# Patient Record
Sex: FEMALE | Race: ASIAN | NOT HISPANIC OR LATINO | Employment: FULL TIME | URBAN - METROPOLITAN AREA
[De-identification: names, ages, dates, MRNs, and addresses within clinical notes are randomized per-mention and may not be internally consistent; named-entity substitution may affect disease eponyms.]

---

## 2023-09-07 ENCOUNTER — HOSPITAL ENCOUNTER (EMERGENCY)
Facility: CLINIC | Age: 23
Discharge: HOME | End: 2023-09-07
Payer: COMMERCIAL

## 2023-09-07 VITALS
OXYGEN SATURATION: 100 % | HEART RATE: 71 BPM | DIASTOLIC BLOOD PRESSURE: 50 MMHG | RESPIRATION RATE: 16 BRPM | WEIGHT: 180 LBS | SYSTOLIC BLOOD PRESSURE: 119 MMHG | TEMPERATURE: 98 F

## 2023-09-07 DIAGNOSIS — R11.2 NAUSEA AND VOMITING, UNSPECIFIED VOMITING TYPE: Primary | ICD-10-CM

## 2023-09-07 DIAGNOSIS — E87.6 HYPOKALEMIA: ICD-10-CM

## 2023-09-07 DIAGNOSIS — F12.90 MARIJUANA USE: ICD-10-CM

## 2023-09-07 LAB
ALBUMIN SERPL BCG-MCNC: 4.4 G/DL (ref 3.5–5.2)
ALBUMIN UR-MCNC: 20 MG/DL
ALP SERPL-CCNC: 68 U/L (ref 35–104)
ALT SERPL W P-5'-P-CCNC: 27 U/L (ref 0–50)
AMORPH CRY #/AREA URNS HPF: ABNORMAL /HPF
ANION GAP SERPL CALCULATED.3IONS-SCNC: 13 MMOL/L (ref 7–15)
APPEARANCE UR: ABNORMAL
AST SERPL W P-5'-P-CCNC: 23 U/L (ref 0–45)
BACTERIA #/AREA URNS HPF: ABNORMAL /HPF
BASOPHILS # BLD AUTO: 0 10E3/UL (ref 0–0.2)
BASOPHILS NFR BLD AUTO: 0 %
BILIRUB SERPL-MCNC: 0.5 MG/DL
BILIRUB UR QL STRIP: NEGATIVE
BUN SERPL-MCNC: 7.9 MG/DL (ref 6–20)
CALCIUM SERPL-MCNC: 9.4 MG/DL (ref 8.6–10)
CHLORIDE SERPL-SCNC: 106 MMOL/L (ref 98–107)
COLOR UR AUTO: ABNORMAL
CREAT SERPL-MCNC: 0.82 MG/DL (ref 0.51–0.95)
DEPRECATED HCO3 PLAS-SCNC: 22 MMOL/L (ref 22–29)
EGFRCR SERPLBLD CKD-EPI 2021: >90 ML/MIN/1.73M2
EOSINOPHIL # BLD AUTO: 0 10E3/UL (ref 0–0.7)
EOSINOPHIL NFR BLD AUTO: 1 %
ERYTHROCYTE [DISTWIDTH] IN BLOOD BY AUTOMATED COUNT: 14.3 % (ref 10–15)
GLUCOSE SERPL-MCNC: 83 MG/DL (ref 70–99)
GLUCOSE UR STRIP-MCNC: NEGATIVE MG/DL
HCG SERPL QL: NEGATIVE
HCT VFR BLD AUTO: 37.6 % (ref 35–47)
HGB BLD-MCNC: 11.9 G/DL (ref 11.7–15.7)
HGB UR QL STRIP: NEGATIVE
IMM GRANULOCYTES # BLD: 0 10E3/UL
IMM GRANULOCYTES NFR BLD: 0 %
KETONES UR STRIP-MCNC: 40 MG/DL
LEUKOCYTE ESTERASE UR QL STRIP: NEGATIVE
LIPASE SERPL-CCNC: 18 U/L (ref 13–60)
LYMPHOCYTES # BLD AUTO: 3 10E3/UL (ref 0.8–5.3)
LYMPHOCYTES NFR BLD AUTO: 42 %
MCH RBC QN AUTO: 24.7 PG (ref 26.5–33)
MCHC RBC AUTO-ENTMCNC: 31.6 G/DL (ref 31.5–36.5)
MCV RBC AUTO: 78 FL (ref 78–100)
MONOCYTES # BLD AUTO: 0.5 10E3/UL (ref 0–1.3)
MONOCYTES NFR BLD AUTO: 8 %
MUCOUS THREADS #/AREA URNS LPF: PRESENT /LPF
NEUTROPHILS # BLD AUTO: 3.5 10E3/UL (ref 1.6–8.3)
NEUTROPHILS NFR BLD AUTO: 49 %
NITRATE UR QL: NEGATIVE
NRBC # BLD AUTO: 0 10E3/UL
NRBC BLD AUTO-RTO: 0 /100
PH UR STRIP: 7.5 [PH] (ref 5–7)
PLATELET # BLD AUTO: 317 10E3/UL (ref 150–450)
POTASSIUM SERPL-SCNC: 3.3 MMOL/L (ref 3.4–5.3)
PROT SERPL-MCNC: 7.1 G/DL (ref 6.4–8.3)
RBC # BLD AUTO: 4.81 10E6/UL (ref 3.8–5.2)
RBC URINE: 3 /HPF
SODIUM SERPL-SCNC: 141 MMOL/L (ref 136–145)
SP GR UR STRIP: 1.02 (ref 1–1.03)
SQUAMOUS EPITHELIAL: 1 /HPF
UROBILINOGEN UR STRIP-MCNC: 2 MG/DL
WBC # BLD AUTO: 7.1 10E3/UL (ref 4–11)
WBC URINE: 0 /HPF

## 2023-09-07 PROCEDURE — 96360 HYDRATION IV INFUSION INIT: CPT

## 2023-09-07 PROCEDURE — 81001 URINALYSIS AUTO W/SCOPE: CPT

## 2023-09-07 PROCEDURE — 80053 COMPREHEN METABOLIC PANEL: CPT

## 2023-09-07 PROCEDURE — 85025 COMPLETE CBC W/AUTO DIFF WBC: CPT

## 2023-09-07 PROCEDURE — 250N000013 HC RX MED GY IP 250 OP 250 PS 637

## 2023-09-07 PROCEDURE — 84703 CHORIONIC GONADOTROPIN ASSAY: CPT

## 2023-09-07 PROCEDURE — 250N000011 HC RX IP 250 OP 636

## 2023-09-07 PROCEDURE — 258N000003 HC RX IP 258 OP 636

## 2023-09-07 PROCEDURE — 99283 EMERGENCY DEPT VISIT LOW MDM: CPT | Mod: 25

## 2023-09-07 PROCEDURE — 36415 COLL VENOUS BLD VENIPUNCTURE: CPT | Performed by: EMERGENCY MEDICINE

## 2023-09-07 PROCEDURE — 83690 ASSAY OF LIPASE: CPT

## 2023-09-07 RX ORDER — ONDANSETRON 4 MG/1
4 TABLET, ORALLY DISINTEGRATING ORAL ONCE
Status: COMPLETED | OUTPATIENT
Start: 2023-09-07 | End: 2023-09-07

## 2023-09-07 RX ORDER — POTASSIUM CHLORIDE 1.5 G/1.58G
40 POWDER, FOR SOLUTION ORAL ONCE
Status: COMPLETED | OUTPATIENT
Start: 2023-09-07 | End: 2023-09-07

## 2023-09-07 RX ORDER — ONDANSETRON 4 MG/1
4 TABLET, ORALLY DISINTEGRATING ORAL EVERY 8 HOURS PRN
Qty: 12 TABLET | Refills: 0 | Status: SHIPPED | OUTPATIENT
Start: 2023-09-07 | End: 2023-09-19

## 2023-09-07 RX ADMIN — SODIUM CHLORIDE 1000 ML: 9 INJECTION, SOLUTION INTRAVENOUS at 17:29

## 2023-09-07 RX ADMIN — ONDANSETRON 4 MG: 4 TABLET, ORALLY DISINTEGRATING ORAL at 17:30

## 2023-09-07 RX ADMIN — POTASSIUM CHLORIDE 40 MEQ: 1.5 POWDER, FOR SOLUTION ORAL at 17:34

## 2023-09-07 ASSESSMENT — ACTIVITIES OF DAILY LIVING (ADL): ADLS_ACUITY_SCORE: 35

## 2023-09-07 NOTE — ED TRIAGE NOTES
Patient presents with 2 months of nausea and vomiting. Went to [facility] yesterday and had urine and pregnancy test. States she is not pregnant.

## 2023-09-07 NOTE — ED NOTES
Patient in with complaints of intermittent N/V for 2 months. Patient was seen [facility] for [facility] same thing. Patient states not getting any better. Patient anxious and hyperventilating.

## 2023-09-07 NOTE — ED PROVIDER NOTES
History     Chief Complaint:  Vomiting       HPI   Suzie Islas is a 23 year old female who presents with nausea and vomiting. The nausea has been ongoing for a couple months. She has been vomiting since a couple weeks ago, but it stopped for a period of time and picked back up this week. The patient reports having chills and hot flashes a couple weeks ago and then again this week. She reports waking up and vomiting and then being okay for the rest of the day. She reports shortness of breath with her nausea, regular marijuana use, minimal alcohol use, and that her last period was a couple weeks ago. She is on denies vomiting today, blood in her vomit, diarrhea, blood in her stool, dysuria, urinary frequency, chest pain, or abdominal pain.    Of note, she has struggled with nausea her whole life and she believes this is more.    Independent Historian:    The patient provided the history noted above.    Review of External Notes:  No recent ED or UC visits on chart review    Medications:    Lexapro  Vistaril  Ativan    Past Medical History:    Anxiety/depression    Past Surgical History:    Third molar extraction     Physical Exam   Patient Vitals for the past 24 hrs:   BP Temp Temp src Pulse Resp SpO2 Weight   09/07/23 1140 119/50 98  F (36.7  C) Temporal 71 16 100 % 81.6 kg (180 lb)      Physical Exam  General: Nontoxic-appearing adult female sitting on gurney holding stuffed animal  HENT:   Head: Atraumatic.  Mouth/Throat: Oropharynx clear and moist.  Eyes: Conjunctive and EOM normal. PERRLA.  Neck: Normal ROM. No rigidity.  CV: Regular rate and rhythm. Normal S1, S2. No appreciable murmurs.  Resp: Lungs clear to auscultation bilaterally. Normal respiratory effort. No stridor or cough observed.  GI: Bowel sounds normal. Abdomen soft, non distended and nontender. No rebound or guarding.  MSK: Normal range of motion.  Skin: Warm and dry.  No rashes.  Neuro: Awake, alert, oriented x 3.  Psych: Normal mood and  affect.     Emergency Department Course     Laboratory:  Labs Ordered and Resulted from Time of ED Arrival to Time of ED Departure   COMPREHENSIVE METABOLIC PANEL - Abnormal       Result Value    Sodium 141      Potassium 3.3 (*)     Chloride 106      Carbon Dioxide (CO2) 22      Anion Gap 13      Urea Nitrogen 7.9      Creatinine 0.82      Calcium 9.4      Glucose 83      Alkaline Phosphatase 68      AST 23      ALT 27      Protein Total 7.1      Albumin 4.4      Bilirubin Total 0.5      GFR Estimate >90     ROUTINE UA WITH MICROSCOPIC - Abnormal    Color Urine Orange (*)     Appearance Urine Cloudy (*)     Glucose Urine Negative      Bilirubin Urine Negative      Ketones Urine 40 (*)     Specific Gravity Urine 1.024      Blood Urine Negative      pH Urine 7.5 (*)     Protein Albumin Urine 20 (*)     Urobilinogen Urine 2.0      Nitrite Urine Negative      Leukocyte Esterase Urine Negative      Bacteria Urine Few (*)     Mucus Urine Present (*)     Amorphous Crystals Urine Few (*)     RBC Urine 3 (*)     WBC Urine 0      Squamous Epithelials Urine 1     CBC WITH PLATELETS AND DIFFERENTIAL - Abnormal    WBC Count 7.1      RBC Count 4.81      Hemoglobin 11.9      Hematocrit 37.6      MCV 78      MCH 24.7 (*)     MCHC 31.6      RDW 14.3      Platelet Count 317      % Neutrophils 49      % Lymphocytes 42      % Monocytes 8      % Eosinophils 1      % Basophils 0      % Immature Granulocytes 0      NRBCs per 100 WBC 0      Absolute Neutrophils 3.5      Absolute Lymphocytes 3.0      Absolute Monocytes 0.5      Absolute Eosinophils 0.0      Absolute Basophils 0.0      Absolute Immature Granulocytes 0.0      Absolute NRBCs 0.0     LIPASE - Normal    Lipase 18     HCG QUALITATIVE PREGNANCY - Normal    hCG Serum Qualitative Negative        Emergency Department Course & Assessments:       Interventions:  Medications   0.9% sodium chloride BOLUS (0 mLs Intravenous Stopped 9/7/23 7962)   ondansetron (ZOFRAN ODT) ODT tab 4 mg (4  mg Oral $Given 9/7/23 1730)   potassium chloride (KLOR-CON) Packet 40 mEq (40 mEq Oral $Given 9/7/23 1735)        Assessments:  1646 I obtained history and examined the patient as noted above.   1827 I rechecked and updated the patient. I deemed the patient safe to discharge home. Tolerated oral.    Independent Interpretation (X-rays, CTs, rhythm strip):  None    Consultations/Discussion of Management or Tests:  None       Social Determinants of Health affecting care:  None     Disposition:  The patient was discharged to home.     Impression & Plan    CMS Diagnoses: None    Medical Decision Making:  Is a pleasant 23-year-old femaleRuby with a history of anxiety and depression who presents to the emergency department for nausea and vomiting.  Patient states that this is a chronic issue for her.  On arrival here she is afebrile and nontoxic-appearing.  On exam, she has no abdominal tenderness.  Pregnancy test negative.  Urine without evidence for infection.  No leukocytosis or anemia on CBC.  Mildly low potassium on metabolic panel, which was replaced orally here after fluids and Zofran.  No JAUN.  No glucose derangement. Lipase WNL, reassuring against pancreatitis.  Patient does not regularly use marijuana and I wonder if cyclical vomiting could be at play from her cannabis use.  At this juncture, I feel she is safe for discharge home with a nontender  abdomen, normal vitals and reassuring labs.  Provided her a prescription for Zofran for nausea at home.  Patient will call her PCP for close follow-up in clinic for recheck.  Return precautions discussed and provided in writing.  She will come back if she develops a fever, abdominal pain, blood in her vomit, blood in her stools, urinary symptoms or any other acute new problem.  Patient felt reassured and was discharged home in improved condition    Diagnosis:    ICD-10-CM    1. Nausea and vomiting, unspecified vomiting type  R11.2       2. Marijuana use  F12.90       3.  Hypokalemia  E87.6          Discharge Medications:  New Prescriptions    ONDANSETRON (ZOFRAN ODT) 4 MG ODT TAB    Take 1 tablet (4 mg) by mouth every 8 hours as needed for nausea      Scribe Disclosure:  I, Jhonny Chin, am serving as a scribe at 4:17 PM on 9/7/2023 to document services personally performed by Lydia Patrick PA-C based on my observations and the provider's statements to me.               Lydia Patrick PA-C  09/07/23 8945

## 2024-04-15 ENCOUNTER — HOSPITAL ENCOUNTER (INPATIENT)
Facility: CLINIC | Age: 24
LOS: 1 days | Discharge: HOME | End: 2024-04-17
Attending: EMERGENCY MEDICINE | Admitting: OBSTETRICS & GYNECOLOGY
Payer: COMMERCIAL

## 2024-04-15 ENCOUNTER — APPOINTMENT (OUTPATIENT)
Dept: CT IMAGING | Facility: CLINIC | Age: 24
End: 2024-04-15
Attending: EMERGENCY MEDICINE
Payer: COMMERCIAL

## 2024-04-15 ENCOUNTER — APPOINTMENT (OUTPATIENT)
Dept: ULTRASOUND IMAGING | Facility: CLINIC | Age: 24
End: 2024-04-15
Attending: EMERGENCY MEDICINE
Payer: COMMERCIAL

## 2024-04-15 ENCOUNTER — ANESTHESIA (OUTPATIENT)
Dept: SURGERY | Facility: CLINIC | Age: 24
End: 2024-04-15
Payer: COMMERCIAL

## 2024-04-15 ENCOUNTER — ANESTHESIA EVENT (OUTPATIENT)
Dept: SURGERY | Facility: CLINIC | Age: 24
End: 2024-04-15
Payer: COMMERCIAL

## 2024-04-15 DIAGNOSIS — N83.202 HEMORRHAGIC CYST OF LEFT OVARY: ICD-10-CM

## 2024-04-15 DIAGNOSIS — Z98.890 S/P LAPAROSCOPY: Primary | ICD-10-CM

## 2024-04-15 PROBLEM — K66.1 HEMOPERITONEUM: Status: ACTIVE | Noted: 2024-04-15

## 2024-04-15 PROBLEM — N83.209 HEMORRHAGIC OVARIAN CYST: Status: ACTIVE | Noted: 2024-04-15

## 2024-04-15 LAB
ABO/RH(D): NORMAL
ALBUMIN SERPL BCG-MCNC: 3.6 G/DL (ref 3.5–5.2)
ALP SERPL-CCNC: 47 U/L (ref 40–150)
ALT SERPL W P-5'-P-CCNC: 14 U/L (ref 0–50)
ANION GAP SERPL CALCULATED.3IONS-SCNC: 11 MMOL/L (ref 7–15)
ANION GAP SERPL CALCULATED.3IONS-SCNC: 12 MMOL/L (ref 7–15)
ANTIBODY SCREEN: NEGATIVE
AST SERPL W P-5'-P-CCNC: 18 U/L (ref 0–45)
BASOPHILS # BLD AUTO: 0 10E3/UL (ref 0–0.2)
BASOPHILS NFR BLD AUTO: 0 %
BILIRUB SERPL-MCNC: 0.3 MG/DL
BLD PROD TYP BPU: NORMAL
BLOOD COMPONENT TYPE: NORMAL
BUN SERPL-MCNC: 6.6 MG/DL (ref 6–20)
BUN SERPL-MCNC: 6.6 MG/DL (ref 6–20)
CALCIUM SERPL-MCNC: 8.9 MG/DL (ref 8.6–10)
CALCIUM SERPL-MCNC: 8.9 MG/DL (ref 8.6–10)
CHLORIDE SERPL-SCNC: 106 MMOL/L (ref 98–107)
CHLORIDE SERPL-SCNC: 107 MMOL/L (ref 98–107)
CODING SYSTEM: NORMAL
CREAT SERPL-MCNC: 0.91 MG/DL (ref 0.51–0.95)
CREAT SERPL-MCNC: 0.92 MG/DL (ref 0.51–0.95)
CROSSMATCH: NORMAL
DEPRECATED HCO3 PLAS-SCNC: 22 MMOL/L (ref 22–29)
DEPRECATED HCO3 PLAS-SCNC: 23 MMOL/L (ref 22–29)
EGFRCR SERPLBLD CKD-EPI 2021: 89 ML/MIN/1.73M2
EGFRCR SERPLBLD CKD-EPI 2021: 90 ML/MIN/1.73M2
EOSINOPHIL # BLD AUTO: 0.1 10E3/UL (ref 0–0.7)
EOSINOPHIL NFR BLD AUTO: 1 %
ERYTHROCYTE [DISTWIDTH] IN BLOOD BY AUTOMATED COUNT: 14.2 % (ref 10–15)
ERYTHROCYTE [DISTWIDTH] IN BLOOD BY AUTOMATED COUNT: 14.3 % (ref 10–15)
GLUCOSE SERPL-MCNC: 144 MG/DL (ref 70–99)
GLUCOSE SERPL-MCNC: 144 MG/DL (ref 70–99)
HCG SER QL IA.RAPID: NEGATIVE
HCT VFR BLD AUTO: 23.1 % (ref 35–47)
HCT VFR BLD AUTO: 29.2 % (ref 35–47)
HGB BLD-MCNC: 7.5 G/DL (ref 11.7–15.7)
HGB BLD-MCNC: 8.1 G/DL (ref 11.7–15.7)
HGB BLD-MCNC: 8.1 G/DL (ref 11.7–15.7)
HGB BLD-MCNC: 8.7 G/DL (ref 11.7–15.7)
HGB BLD-MCNC: 9.6 G/DL (ref 11.7–15.7)
HOLD SPECIMEN: NORMAL
HOLD SPECIMEN: NORMAL
IMM GRANULOCYTES # BLD: 0.1 10E3/UL
IMM GRANULOCYTES NFR BLD: 0 %
ISSUE DATE AND TIME: NORMAL
LYMPHOCYTES # BLD AUTO: 3.2 10E3/UL (ref 0.8–5.3)
LYMPHOCYTES NFR BLD AUTO: 21 %
MCH RBC QN AUTO: 25.4 PG (ref 26.5–33)
MCH RBC QN AUTO: 25.5 PG (ref 26.5–33)
MCHC RBC AUTO-ENTMCNC: 32.5 G/DL (ref 31.5–36.5)
MCHC RBC AUTO-ENTMCNC: 32.9 G/DL (ref 31.5–36.5)
MCV RBC AUTO: 77 FL (ref 78–100)
MCV RBC AUTO: 79 FL (ref 78–100)
MONOCYTES # BLD AUTO: 0.9 10E3/UL (ref 0–1.3)
MONOCYTES NFR BLD AUTO: 6 %
NEUTROPHILS # BLD AUTO: 10.9 10E3/UL (ref 1.6–8.3)
NEUTROPHILS NFR BLD AUTO: 72 %
NRBC # BLD AUTO: 0 10E3/UL
NRBC BLD AUTO-RTO: 0 /100
PLATELET # BLD AUTO: 229 10E3/UL (ref 150–450)
PLATELET # BLD AUTO: 328 10E3/UL (ref 150–450)
POTASSIUM SERPL-SCNC: 3.1 MMOL/L (ref 3.4–5.3)
POTASSIUM SERPL-SCNC: 3.1 MMOL/L (ref 3.4–5.3)
POTASSIUM SERPL-SCNC: 4 MMOL/L (ref 3.4–5.3)
PROT SERPL-MCNC: 6.1 G/DL (ref 6.4–8.3)
RBC # BLD AUTO: 2.94 10E6/UL (ref 3.8–5.2)
RBC # BLD AUTO: 3.78 10E6/UL (ref 3.8–5.2)
SODIUM SERPL-SCNC: 140 MMOL/L (ref 135–145)
SODIUM SERPL-SCNC: 141 MMOL/L (ref 135–145)
SPECIMEN EXPIRATION DATE: NORMAL
UNIT ABO/RH: NORMAL
UNIT NUMBER: NORMAL
UNIT STATUS: NORMAL
UNIT TYPE ISBT: 5100
WBC # BLD AUTO: 10.4 10E3/UL (ref 4–11)
WBC # BLD AUTO: 15.2 10E3/UL (ref 4–11)

## 2024-04-15 PROCEDURE — 36415 COLL VENOUS BLD VENIPUNCTURE: CPT | Performed by: OBSTETRICS & GYNECOLOGY

## 2024-04-15 PROCEDURE — 84155 ASSAY OF PROTEIN SERUM: CPT | Performed by: EMERGENCY MEDICINE

## 2024-04-15 PROCEDURE — 250N000011 HC RX IP 250 OP 636: Performed by: ANESTHESIOLOGY

## 2024-04-15 PROCEDURE — 370N000017 HC ANESTHESIA TECHNICAL FEE, PER MIN: Performed by: OBSTETRICS & GYNECOLOGY

## 2024-04-15 PROCEDURE — 86923 COMPATIBILITY TEST ELECTRIC: CPT | Performed by: STUDENT IN AN ORGANIZED HEALTH CARE EDUCATION/TRAINING PROGRAM

## 2024-04-15 PROCEDURE — 36415 COLL VENOUS BLD VENIPUNCTURE: CPT | Performed by: EMERGENCY MEDICINE

## 2024-04-15 PROCEDURE — 85018 HEMOGLOBIN: CPT | Performed by: OBSTETRICS & GYNECOLOGY

## 2024-04-15 PROCEDURE — 86900 BLOOD TYPING SEROLOGIC ABO: CPT | Performed by: EMERGENCY MEDICINE

## 2024-04-15 PROCEDURE — 0W3R4ZZ CONTROL BLEEDING IN GENITOURINARY TRACT, PERCUTANEOUS ENDOSCOPIC APPROACH: ICD-10-PCS | Performed by: OBSTETRICS & GYNECOLOGY

## 2024-04-15 PROCEDURE — 250N000013 HC RX MED GY IP 250 OP 250 PS 637: Performed by: OBSTETRICS & GYNECOLOGY

## 2024-04-15 PROCEDURE — 58661 LAPAROSCOPY REMOVE ADNEXA: CPT | Performed by: ANESTHESIOLOGY

## 2024-04-15 PROCEDURE — 250N000011 HC RX IP 250 OP 636: Mod: JZ | Performed by: ANESTHESIOLOGY

## 2024-04-15 PROCEDURE — 250N000009 HC RX 250: Performed by: EMERGENCY MEDICINE

## 2024-04-15 PROCEDURE — 99140 ANES COMP EMERGENCY COND: CPT | Performed by: NURSE ANESTHETIST, CERTIFIED REGISTERED

## 2024-04-15 PROCEDURE — 250N000011 HC RX IP 250 OP 636

## 2024-04-15 PROCEDURE — 272N000001 HC OR GENERAL SUPPLY STERILE: Performed by: OBSTETRICS & GYNECOLOGY

## 2024-04-15 PROCEDURE — 76856 US EXAM PELVIC COMPLETE: CPT

## 2024-04-15 PROCEDURE — 84132 ASSAY OF SERUM POTASSIUM: CPT | Performed by: OBSTETRICS & GYNECOLOGY

## 2024-04-15 PROCEDURE — 250N000009 HC RX 250: Performed by: ANESTHESIOLOGY

## 2024-04-15 PROCEDURE — 250N000009 HC RX 250: Performed by: OBSTETRICS & GYNECOLOGY

## 2024-04-15 PROCEDURE — 96374 THER/PROPH/DIAG INJ IV PUSH: CPT

## 2024-04-15 PROCEDURE — 250N000011 HC RX IP 250 OP 636: Performed by: OBSTETRICS & GYNECOLOGY

## 2024-04-15 PROCEDURE — 710N000009 HC RECOVERY PHASE 1, LEVEL 1, PER MIN: Performed by: OBSTETRICS & GYNECOLOGY

## 2024-04-15 PROCEDURE — P9016 RBC LEUKOCYTES REDUCED: HCPCS | Performed by: OBSTETRICS & GYNECOLOGY

## 2024-04-15 PROCEDURE — 84703 CHORIONIC GONADOTROPIN ASSAY: CPT

## 2024-04-15 PROCEDURE — 96361 HYDRATE IV INFUSION ADD-ON: CPT

## 2024-04-15 PROCEDURE — 258N000003 HC RX IP 258 OP 636: Performed by: ANESTHESIOLOGY

## 2024-04-15 PROCEDURE — 86923 COMPATIBILITY TEST ELECTRIC: CPT | Performed by: OBSTETRICS & GYNECOLOGY

## 2024-04-15 PROCEDURE — 258N000003 HC RX IP 258 OP 636: Performed by: EMERGENCY MEDICINE

## 2024-04-15 PROCEDURE — 74177 CT ABD & PELVIS W/CONTRAST: CPT

## 2024-04-15 PROCEDURE — 80048 BASIC METABOLIC PNL TOTAL CA: CPT | Performed by: EMERGENCY MEDICINE

## 2024-04-15 PROCEDURE — P9045 ALBUMIN (HUMAN), 5%, 250 ML: HCPCS | Mod: JZ | Performed by: ANESTHESIOLOGY

## 2024-04-15 PROCEDURE — 258N000001 HC RX 258: Performed by: OBSTETRICS & GYNECOLOGY

## 2024-04-15 PROCEDURE — 99291 CRITICAL CARE FIRST HOUR: CPT | Mod: 25

## 2024-04-15 PROCEDURE — 250N000011 HC RX IP 250 OP 636: Performed by: EMERGENCY MEDICINE

## 2024-04-15 PROCEDURE — 360N000076 HC SURGERY LEVEL 3, PER MIN: Performed by: OBSTETRICS & GYNECOLOGY

## 2024-04-15 PROCEDURE — 0W9G4ZZ DRAINAGE OF PERITONEAL CAVITY, PERCUTANEOUS ENDOSCOPIC APPROACH: ICD-10-PCS | Performed by: OBSTETRICS & GYNECOLOGY

## 2024-04-15 PROCEDURE — 999N000141 HC STATISTIC PRE-PROCEDURE NURSING ASSESSMENT: Performed by: OBSTETRICS & GYNECOLOGY

## 2024-04-15 PROCEDURE — 85025 COMPLETE CBC W/AUTO DIFF WBC: CPT | Performed by: EMERGENCY MEDICINE

## 2024-04-15 PROCEDURE — 96375 TX/PRO/DX INJ NEW DRUG ADDON: CPT

## 2024-04-15 PROCEDURE — 58661 LAPAROSCOPY REMOVE ADNEXA: CPT | Performed by: NURSE ANESTHETIST, CERTIFIED REGISTERED

## 2024-04-15 PROCEDURE — 96376 TX/PRO/DX INJ SAME DRUG ADON: CPT

## 2024-04-15 PROCEDURE — 85027 COMPLETE CBC AUTOMATED: CPT | Performed by: EMERGENCY MEDICINE

## 2024-04-15 RX ORDER — ONDANSETRON 2 MG/ML
INJECTION INTRAMUSCULAR; INTRAVENOUS PRN
Status: DISCONTINUED | OUTPATIENT
Start: 2024-04-15 | End: 2024-04-15

## 2024-04-15 RX ORDER — FENTANYL CITRATE 50 UG/ML
INJECTION, SOLUTION INTRAMUSCULAR; INTRAVENOUS PRN
Status: DISCONTINUED | OUTPATIENT
Start: 2024-04-15 | End: 2024-04-15

## 2024-04-15 RX ORDER — FENTANYL CITRATE 0.05 MG/ML
50 INJECTION, SOLUTION INTRAMUSCULAR; INTRAVENOUS EVERY 5 MIN PRN
Status: DISCONTINUED | OUTPATIENT
Start: 2024-04-15 | End: 2024-04-15 | Stop reason: HOSPADM

## 2024-04-15 RX ORDER — IOPAMIDOL 755 MG/ML
91 INJECTION, SOLUTION INTRAVASCULAR ONCE
Status: COMPLETED | OUTPATIENT
Start: 2024-04-15 | End: 2024-04-15

## 2024-04-15 RX ORDER — ACETAMINOPHEN 325 MG/1
975 TABLET ORAL EVERY 6 HOURS
Qty: 36 TABLET | Refills: 0 | Status: DISCONTINUED | OUTPATIENT
Start: 2024-04-15 | End: 2024-04-17 | Stop reason: HOSPADM

## 2024-04-15 RX ORDER — HYDROMORPHONE HCL IN WATER/PF 6 MG/30 ML
0.2 PATIENT CONTROLLED ANALGESIA SYRINGE INTRAVENOUS EVERY 5 MIN PRN
Status: DISCONTINUED | OUTPATIENT
Start: 2024-04-15 | End: 2024-04-15 | Stop reason: HOSPADM

## 2024-04-15 RX ORDER — LIDOCAINE HYDROCHLORIDE 20 MG/ML
INJECTION, SOLUTION INFILTRATION; PERINEURAL PRN
Status: DISCONTINUED | OUTPATIENT
Start: 2024-04-15 | End: 2024-04-15

## 2024-04-15 RX ORDER — NALOXONE HYDROCHLORIDE 0.4 MG/ML
0.4 INJECTION, SOLUTION INTRAMUSCULAR; INTRAVENOUS; SUBCUTANEOUS
Status: DISCONTINUED | OUTPATIENT
Start: 2024-04-15 | End: 2024-04-17 | Stop reason: HOSPADM

## 2024-04-15 RX ORDER — HYDROMORPHONE HYDROCHLORIDE 1 MG/ML
0.5 INJECTION, SOLUTION INTRAMUSCULAR; INTRAVENOUS; SUBCUTANEOUS ONCE
Status: COMPLETED | OUTPATIENT
Start: 2024-04-15 | End: 2024-04-15

## 2024-04-15 RX ORDER — PROPOFOL 10 MG/ML
INJECTION, EMULSION INTRAVENOUS CONTINUOUS PRN
Status: DISCONTINUED | OUTPATIENT
Start: 2024-04-15 | End: 2024-04-15

## 2024-04-15 RX ORDER — DEXMEDETOMIDINE HYDROCHLORIDE 4 UG/ML
INJECTION, SOLUTION INTRAVENOUS PRN
Status: DISCONTINUED | OUTPATIENT
Start: 2024-04-15 | End: 2024-04-15

## 2024-04-15 RX ORDER — HYDROMORPHONE HCL IN WATER/PF 6 MG/30 ML
0.2 PATIENT CONTROLLED ANALGESIA SYRINGE INTRAVENOUS
Status: DISCONTINUED | OUTPATIENT
Start: 2024-04-15 | End: 2024-04-17 | Stop reason: HOSPADM

## 2024-04-15 RX ORDER — NALOXONE HYDROCHLORIDE 0.4 MG/ML
0.2 INJECTION, SOLUTION INTRAMUSCULAR; INTRAVENOUS; SUBCUTANEOUS
Status: DISCONTINUED | OUTPATIENT
Start: 2024-04-15 | End: 2024-04-17 | Stop reason: HOSPADM

## 2024-04-15 RX ORDER — CRANBERRY FRUIT EXTRACT 250 MG
1 CAPSULE ORAL AT BEDTIME
COMMUNITY

## 2024-04-15 RX ORDER — NALOXONE HYDROCHLORIDE 0.4 MG/ML
0.1 INJECTION, SOLUTION INTRAMUSCULAR; INTRAVENOUS; SUBCUTANEOUS
Status: DISCONTINUED | OUTPATIENT
Start: 2024-04-15 | End: 2024-04-15 | Stop reason: HOSPADM

## 2024-04-15 RX ORDER — MAGNESIUM HYDROXIDE 1200 MG/15ML
LIQUID ORAL PRN
Status: DISCONTINUED | OUTPATIENT
Start: 2024-04-15 | End: 2024-04-15 | Stop reason: HOSPADM

## 2024-04-15 RX ORDER — GLYCOPYRROLATE 0.2 MG/ML
INJECTION, SOLUTION INTRAMUSCULAR; INTRAVENOUS PRN
Status: DISCONTINUED | OUTPATIENT
Start: 2024-04-15 | End: 2024-04-15

## 2024-04-15 RX ORDER — BUPIVACAINE HYDROCHLORIDE AND EPINEPHRINE 2.5; 5 MG/ML; UG/ML
INJECTION, SOLUTION EPIDURAL; INFILTRATION; INTRACAUDAL; PERINEURAL PRN
Status: DISCONTINUED | OUTPATIENT
Start: 2024-04-15 | End: 2024-04-15 | Stop reason: HOSPADM

## 2024-04-15 RX ORDER — SCOLOPAMINE TRANSDERMAL SYSTEM 1 MG/1
1 PATCH, EXTENDED RELEASE TRANSDERMAL
Status: DISCONTINUED | OUTPATIENT
Start: 2024-04-15 | End: 2024-04-17 | Stop reason: HOSPADM

## 2024-04-15 RX ORDER — ONDANSETRON 2 MG/ML
4 INJECTION INTRAMUSCULAR; INTRAVENOUS ONCE
Status: COMPLETED | OUTPATIENT
Start: 2024-04-15 | End: 2024-04-15

## 2024-04-15 RX ORDER — SODIUM CHLORIDE, SODIUM LACTATE, POTASSIUM CHLORIDE, CALCIUM CHLORIDE 600; 310; 30; 20 MG/100ML; MG/100ML; MG/100ML; MG/100ML
INJECTION, SOLUTION INTRAVENOUS CONTINUOUS
Status: DISCONTINUED | OUTPATIENT
Start: 2024-04-15 | End: 2024-04-15 | Stop reason: HOSPADM

## 2024-04-15 RX ORDER — HYDROMORPHONE HCL IN WATER/PF 6 MG/30 ML
0.4 PATIENT CONTROLLED ANALGESIA SYRINGE INTRAVENOUS EVERY 5 MIN PRN
Status: DISCONTINUED | OUTPATIENT
Start: 2024-04-15 | End: 2024-04-15 | Stop reason: HOSPADM

## 2024-04-15 RX ORDER — SODIUM CHLORIDE, SODIUM LACTATE, POTASSIUM CHLORIDE, CALCIUM CHLORIDE 600; 310; 30; 20 MG/100ML; MG/100ML; MG/100ML; MG/100ML
INJECTION, SOLUTION INTRAVENOUS CONTINUOUS PRN
Status: DISCONTINUED | OUTPATIENT
Start: 2024-04-15 | End: 2024-04-15

## 2024-04-15 RX ORDER — PROPOFOL 10 MG/ML
INJECTION, EMULSION INTRAVENOUS PRN
Status: DISCONTINUED | OUTPATIENT
Start: 2024-04-15 | End: 2024-04-15

## 2024-04-15 RX ORDER — IBUPROFEN 400 MG/1
800 TABLET, FILM COATED ORAL EVERY 6 HOURS
Status: DISCONTINUED | OUTPATIENT
Start: 2024-04-15 | End: 2024-04-17 | Stop reason: HOSPADM

## 2024-04-15 RX ORDER — ONDANSETRON 4 MG/1
4 TABLET, ORALLY DISINTEGRATING ORAL EVERY 6 HOURS PRN
Status: DISCONTINUED | OUTPATIENT
Start: 2024-04-15 | End: 2024-04-17 | Stop reason: HOSPADM

## 2024-04-15 RX ORDER — PROCHLORPERAZINE MALEATE 10 MG
10 TABLET ORAL EVERY 6 HOURS PRN
Status: DISCONTINUED | OUTPATIENT
Start: 2024-04-15 | End: 2024-04-17 | Stop reason: HOSPADM

## 2024-04-15 RX ORDER — KETOROLAC TROMETHAMINE 15 MG/ML
15 INJECTION, SOLUTION INTRAMUSCULAR; INTRAVENOUS EVERY 6 HOURS
Status: DISCONTINUED | OUTPATIENT
Start: 2024-04-15 | End: 2024-04-17 | Stop reason: HOSPADM

## 2024-04-15 RX ORDER — LACTOBACILLUS RHAMNOSUS GG 10B CELL
1 CAPSULE ORAL AT BEDTIME
COMMUNITY

## 2024-04-15 RX ORDER — LIDOCAINE 40 MG/G
CREAM TOPICAL
Status: DISCONTINUED | OUTPATIENT
Start: 2024-04-15 | End: 2024-04-17 | Stop reason: HOSPADM

## 2024-04-15 RX ORDER — ONDANSETRON 4 MG/1
4 TABLET, ORALLY DISINTEGRATING ORAL EVERY 30 MIN PRN
Status: DISCONTINUED | OUTPATIENT
Start: 2024-04-15 | End: 2024-04-15 | Stop reason: HOSPADM

## 2024-04-15 RX ORDER — DEXAMETHASONE SODIUM PHOSPHATE 4 MG/ML
INJECTION, SOLUTION INTRA-ARTICULAR; INTRALESIONAL; INTRAMUSCULAR; INTRAVENOUS; SOFT TISSUE PRN
Status: DISCONTINUED | OUTPATIENT
Start: 2024-04-15 | End: 2024-04-15

## 2024-04-15 RX ORDER — NEOSTIGMINE METHYLSULFATE 1 MG/ML
VIAL (ML) INJECTION PRN
Status: DISCONTINUED | OUTPATIENT
Start: 2024-04-15 | End: 2024-04-15

## 2024-04-15 RX ORDER — KETOROLAC TROMETHAMINE 15 MG/ML
15 INJECTION, SOLUTION INTRAMUSCULAR; INTRAVENOUS ONCE
Status: COMPLETED | OUTPATIENT
Start: 2024-04-15 | End: 2024-04-15

## 2024-04-15 RX ORDER — OXYCODONE HYDROCHLORIDE 5 MG/1
5 TABLET ORAL EVERY 4 HOURS PRN
Status: DISCONTINUED | OUTPATIENT
Start: 2024-04-15 | End: 2024-04-17 | Stop reason: HOSPADM

## 2024-04-15 RX ORDER — OXYCODONE HYDROCHLORIDE 5 MG/1
10 TABLET ORAL EVERY 4 HOURS PRN
Status: DISCONTINUED | OUTPATIENT
Start: 2024-04-15 | End: 2024-04-17 | Stop reason: HOSPADM

## 2024-04-15 RX ORDER — ONDANSETRON 2 MG/ML
4 INJECTION INTRAMUSCULAR; INTRAVENOUS EVERY 30 MIN PRN
Status: DISCONTINUED | OUTPATIENT
Start: 2024-04-15 | End: 2024-04-15 | Stop reason: HOSPADM

## 2024-04-15 RX ORDER — DIMENHYDRINATE 50 MG/ML
25 INJECTION, SOLUTION INTRAMUSCULAR; INTRAVENOUS
Status: DISCONTINUED | OUTPATIENT
Start: 2024-04-15 | End: 2024-04-15 | Stop reason: HOSPADM

## 2024-04-15 RX ORDER — HYDRALAZINE HYDROCHLORIDE 20 MG/ML
2.5-5 INJECTION INTRAMUSCULAR; INTRAVENOUS EVERY 10 MIN PRN
Status: DISCONTINUED | OUTPATIENT
Start: 2024-04-15 | End: 2024-04-15 | Stop reason: HOSPADM

## 2024-04-15 RX ORDER — LABETALOL HYDROCHLORIDE 5 MG/ML
10 INJECTION, SOLUTION INTRAVENOUS
Status: DISCONTINUED | OUTPATIENT
Start: 2024-04-15 | End: 2024-04-15 | Stop reason: HOSPADM

## 2024-04-15 RX ORDER — ACETAMINOPHEN 325 MG/1
975 TABLET ORAL ONCE
Status: DISCONTINUED | OUTPATIENT
Start: 2024-04-15 | End: 2024-04-15 | Stop reason: HOSPADM

## 2024-04-15 RX ORDER — ONDANSETRON 2 MG/ML
INJECTION INTRAMUSCULAR; INTRAVENOUS
Status: COMPLETED
Start: 2024-04-15 | End: 2024-04-15

## 2024-04-15 RX ORDER — FENTANYL CITRATE 0.05 MG/ML
25 INJECTION, SOLUTION INTRAMUSCULAR; INTRAVENOUS EVERY 5 MIN PRN
Status: DISCONTINUED | OUTPATIENT
Start: 2024-04-15 | End: 2024-04-15 | Stop reason: HOSPADM

## 2024-04-15 RX ORDER — HYDROXYZINE HYDROCHLORIDE 25 MG/1
25 TABLET, FILM COATED ORAL EVERY 6 HOURS PRN
Status: DISCONTINUED | OUTPATIENT
Start: 2024-04-15 | End: 2024-04-15 | Stop reason: HOSPADM

## 2024-04-15 RX ORDER — HYDROMORPHONE HCL IN WATER/PF 6 MG/30 ML
0.4 PATIENT CONTROLLED ANALGESIA SYRINGE INTRAVENOUS
Status: DISCONTINUED | OUTPATIENT
Start: 2024-04-15 | End: 2024-04-17 | Stop reason: HOSPADM

## 2024-04-15 RX ORDER — SODIUM CHLORIDE 9 MG/ML
INJECTION, SOLUTION INTRAVENOUS CONTINUOUS PRN
Status: DISCONTINUED | OUTPATIENT
Start: 2024-04-15 | End: 2024-04-15

## 2024-04-15 RX ORDER — AMOXICILLIN 250 MG
1 CAPSULE ORAL 2 TIMES DAILY
Status: DISCONTINUED | OUTPATIENT
Start: 2024-04-15 | End: 2024-04-17 | Stop reason: HOSPADM

## 2024-04-15 RX ORDER — BISACODYL 10 MG
10 SUPPOSITORY, RECTAL RECTAL DAILY PRN
Status: DISCONTINUED | OUTPATIENT
Start: 2024-04-15 | End: 2024-04-17 | Stop reason: HOSPADM

## 2024-04-15 RX ORDER — ONDANSETRON 2 MG/ML
4 INJECTION INTRAMUSCULAR; INTRAVENOUS EVERY 6 HOURS PRN
Status: DISCONTINUED | OUTPATIENT
Start: 2024-04-15 | End: 2024-04-17 | Stop reason: HOSPADM

## 2024-04-15 RX ORDER — ACETAMINOPHEN 325 MG/1
650 TABLET ORAL EVERY 6 HOURS
Status: DISCONTINUED | OUTPATIENT
Start: 2024-04-18 | End: 2024-04-17 | Stop reason: HOSPADM

## 2024-04-15 RX ADMIN — SODIUM CHLORIDE 1000 ML: 9 INJECTION, SOLUTION INTRAVENOUS at 02:28

## 2024-04-15 RX ADMIN — ACETAMINOPHEN 975 MG: 325 TABLET, FILM COATED ORAL at 09:19

## 2024-04-15 RX ADMIN — ROCURONIUM BROMIDE 50 MG: 50 INJECTION, SOLUTION INTRAVENOUS at 04:11

## 2024-04-15 RX ADMIN — MIDAZOLAM 2 MG: 1 INJECTION INTRAMUSCULAR; INTRAVENOUS at 04:06

## 2024-04-15 RX ADMIN — ROCURONIUM BROMIDE 10 MG: 50 INJECTION, SOLUTION INTRAVENOUS at 04:47

## 2024-04-15 RX ADMIN — SODIUM CHLORIDE: 0.9 INJECTION, SOLUTION INTRAVENOUS at 04:06

## 2024-04-15 RX ADMIN — IOPAMIDOL 91 ML: 755 INJECTION, SOLUTION INTRAVENOUS at 02:25

## 2024-04-15 RX ADMIN — SODIUM CHLORIDE 1000 ML: 9 INJECTION, SOLUTION INTRAVENOUS at 00:35

## 2024-04-15 RX ADMIN — SODIUM CHLORIDE, POTASSIUM CHLORIDE, SODIUM LACTATE AND CALCIUM CHLORIDE: 600; 310; 30; 20 INJECTION, SOLUTION INTRAVENOUS at 07:20

## 2024-04-15 RX ADMIN — NEOSTIGMINE METHYLSULFATE 4 MG: 1 INJECTION, SOLUTION INTRAVENOUS at 05:21

## 2024-04-15 RX ADMIN — PHENYLEPHRINE HYDROCHLORIDE 100 MCG: 10 INJECTION INTRAVENOUS at 04:55

## 2024-04-15 RX ADMIN — PROPOFOL 150 MCG/KG/MIN: 10 INJECTION, EMULSION INTRAVENOUS at 04:11

## 2024-04-15 RX ADMIN — KETOROLAC TROMETHAMINE 15 MG: 15 INJECTION, SOLUTION INTRAMUSCULAR; INTRAVENOUS at 00:44

## 2024-04-15 RX ADMIN — HYDROMORPHONE HYDROCHLORIDE 0.4 MG: 0.2 INJECTION, SOLUTION INTRAMUSCULAR; INTRAVENOUS; SUBCUTANEOUS at 10:56

## 2024-04-15 RX ADMIN — DEXMEDETOMIDINE HYDROCHLORIDE 8 MCG: 200 INJECTION INTRAVENOUS at 04:47

## 2024-04-15 RX ADMIN — HYDROMORPHONE HYDROCHLORIDE 0.5 MG: 1 INJECTION, SOLUTION INTRAMUSCULAR; INTRAVENOUS; SUBCUTANEOUS at 04:31

## 2024-04-15 RX ADMIN — PHENYLEPHRINE HYDROCHLORIDE 200 MCG: 10 INJECTION INTRAVENOUS at 04:20

## 2024-04-15 RX ADMIN — FENTANYL CITRATE 50 MCG: 50 INJECTION INTRAMUSCULAR; INTRAVENOUS at 04:11

## 2024-04-15 RX ADMIN — PROPOFOL 50 MG: 10 INJECTION, EMULSION INTRAVENOUS at 05:21

## 2024-04-15 RX ADMIN — LIDOCAINE HYDROCHLORIDE 100 MG: 20 INJECTION, SOLUTION INFILTRATION; PERINEURAL at 04:11

## 2024-04-15 RX ADMIN — GLYCOPYRROLATE 0.6 MG: 0.2 INJECTION, SOLUTION INTRAMUSCULAR; INTRAVENOUS at 05:21

## 2024-04-15 RX ADMIN — DEXMEDETOMIDINE HYDROCHLORIDE 12 MCG: 200 INJECTION INTRAVENOUS at 04:31

## 2024-04-15 RX ADMIN — IBUPROFEN 800 MG: 400 TABLET ORAL at 11:51

## 2024-04-15 RX ADMIN — IBUPROFEN 800 MG: 400 TABLET ORAL at 17:43

## 2024-04-15 RX ADMIN — ACETAMINOPHEN 975 MG: 325 TABLET, FILM COATED ORAL at 22:02

## 2024-04-15 RX ADMIN — FENTANYL CITRATE 50 MCG: 50 INJECTION INTRAMUSCULAR; INTRAVENOUS at 04:16

## 2024-04-15 RX ADMIN — HYDROMORPHONE HYDROCHLORIDE 0.5 MG: 1 INJECTION, SOLUTION INTRAMUSCULAR; INTRAVENOUS; SUBCUTANEOUS at 00:56

## 2024-04-15 RX ADMIN — SENNOSIDES AND DOCUSATE SODIUM 1 TABLET: 50; 8.6 TABLET ORAL at 09:19

## 2024-04-15 RX ADMIN — DEXAMETHASONE SODIUM PHOSPHATE 4 MG: 4 INJECTION, SOLUTION INTRA-ARTICULAR; INTRALESIONAL; INTRAMUSCULAR; INTRAVENOUS; SOFT TISSUE at 04:16

## 2024-04-15 RX ADMIN — ONDANSETRON 4 MG: 2 INJECTION INTRAMUSCULAR; INTRAVENOUS at 05:08

## 2024-04-15 RX ADMIN — ALBUMIN HUMAN 12.5 G: 0.05 INJECTION, SOLUTION INTRAVENOUS at 06:17

## 2024-04-15 RX ADMIN — PHENYLEPHRINE HYDROCHLORIDE 100 MCG: 10 INJECTION INTRAVENOUS at 04:39

## 2024-04-15 RX ADMIN — PHENYLEPHRINE HYDROCHLORIDE 100 MCG: 10 INJECTION INTRAVENOUS at 04:43

## 2024-04-15 RX ADMIN — SODIUM CHLORIDE, POTASSIUM CHLORIDE, SODIUM LACTATE AND CALCIUM CHLORIDE: 600; 310; 30; 20 INJECTION, SOLUTION INTRAVENOUS at 04:38

## 2024-04-15 RX ADMIN — OXYCODONE HYDROCHLORIDE 10 MG: 5 TABLET ORAL at 20:39

## 2024-04-15 RX ADMIN — SODIUM CHLORIDE 1000 ML: 9 INJECTION, SOLUTION INTRAVENOUS at 00:58

## 2024-04-15 RX ADMIN — PROPOFOL 200 MG: 10 INJECTION, EMULSION INTRAVENOUS at 04:11

## 2024-04-15 RX ADMIN — OXYCODONE HYDROCHLORIDE 5 MG: 5 TABLET ORAL at 09:49

## 2024-04-15 RX ADMIN — PHENYLEPHRINE HYDROCHLORIDE 100 MCG: 10 INJECTION INTRAVENOUS at 04:11

## 2024-04-15 RX ADMIN — ONDANSETRON 4 MG: 2 INJECTION INTRAMUSCULAR; INTRAVENOUS at 02:24

## 2024-04-15 RX ADMIN — SCOPALAMINE 1 PATCH: 1 PATCH, EXTENDED RELEASE TRANSDERMAL at 03:59

## 2024-04-15 RX ADMIN — ACETAMINOPHEN 975 MG: 325 TABLET, FILM COATED ORAL at 15:12

## 2024-04-15 RX ADMIN — KETOROLAC TROMETHAMINE 15 MG: 15 INJECTION, SOLUTION INTRAMUSCULAR; INTRAVENOUS at 23:56

## 2024-04-15 RX ADMIN — PHENYLEPHRINE HYDROCHLORIDE 200 MCG: 10 INJECTION INTRAVENOUS at 04:22

## 2024-04-15 RX ADMIN — SENNOSIDES AND DOCUSATE SODIUM 1 TABLET: 50; 8.6 TABLET ORAL at 22:01

## 2024-04-15 RX ADMIN — HYDROMORPHONE HYDROCHLORIDE 0.5 MG: 1 INJECTION, SOLUTION INTRAMUSCULAR; INTRAVENOUS; SUBCUTANEOUS at 02:20

## 2024-04-15 RX ADMIN — SODIUM CHLORIDE 66 ML: 9 INJECTION, SOLUTION INTRAVENOUS at 02:25

## 2024-04-15 ASSESSMENT — ACTIVITIES OF DAILY LIVING (ADL)
ADLS_ACUITY_SCORE: 20
ADLS_ACUITY_SCORE: 38
ADLS_ACUITY_SCORE: 21
ADLS_ACUITY_SCORE: 35
ADLS_ACUITY_SCORE: 20
ADLS_ACUITY_SCORE: 21
ADLS_ACUITY_SCORE: 20
ADLS_ACUITY_SCORE: 20
ADLS_ACUITY_SCORE: 38
ADLS_ACUITY_SCORE: 35
ADLS_ACUITY_SCORE: 21
ADLS_ACUITY_SCORE: 35
ADLS_ACUITY_SCORE: 20
ADLS_ACUITY_SCORE: 35
ADLS_ACUITY_SCORE: 38
ADLS_ACUITY_SCORE: 35
ADLS_ACUITY_SCORE: 38
ADLS_ACUITY_SCORE: 21
ADLS_ACUITY_SCORE: 35
ADLS_ACUITY_SCORE: 21

## 2024-04-15 ASSESSMENT — COLUMBIA-SUICIDE SEVERITY RATING SCALE - C-SSRS
1. IN THE PAST MONTH, HAVE YOU WISHED YOU WERE DEAD OR WISHED YOU COULD GO TO SLEEP AND NOT WAKE UP?: NO
2. HAVE YOU ACTUALLY HAD ANY THOUGHTS OF KILLING YOURSELF IN THE PAST MONTH?: NO
6. HAVE YOU EVER DONE ANYTHING, STARTED TO DO ANYTHING, OR PREPARED TO DO ANYTHING TO END YOUR LIFE?: NO

## 2024-04-15 ASSESSMENT — LIFESTYLE VARIABLES: TOBACCO_USE: 1

## 2024-04-15 NOTE — PROGRESS NOTES
"Patient seen in postop - hypotensive    S: doing well currently. Reports some abdominal pressure, though nothing like the pain when she was initially admitted. Denies N/V.     O: /43 (BP Location: Left arm, Patient Position: Supine, Cuff Size: Adult Regular)   Pulse 71   Temp 98  F (36.7  C) (Oral)   Resp 23   Ht 1.651 m (5' 5\")   SpO2 100%   BMI 29.95 kg/m       Gen: A/Ox3, NAD  Chest: regular effort  Abd: soft, minimally tender    A/P: Suzie Islas is a 23 year old F s/p lscope for hemoperitoneum and reuptured hemorrhagic cyst.     S/p laparoscopy: pain well managed. Continue PRN meds.   Hypotension: improving with albumin. Plan for 1 unit PRBC.   Hypokalemia: replete with oral potassium.     Elena Hunt MD  She/Her  [phone]  04/15/24 7:15 AM     Update:     Patient in short stay room now.   Pain is improving, though still at a 6-7/10 with meds. Referred pain to her shoulder. She has not eaten anything yet. She has ambulated to the bathroom. Still feels a little lightheaded. She is planning to walk the halls.     O:   Vitals:    04/15/24 1045 04/15/24 1100 04/15/24 1125 04/15/24 1225   BP: 100/49 104/52 120/50 103/45   BP Location: Left arm Left arm Left arm Left arm   Patient Position: Supine Sitting Supine Supine   Cuff Size: Adult Regular Adult Regular Adult Regular Adult Regular   Pulse: 88 80 78 68   Resp: 20 20 20 18   Temp:   98  F (36.7  C)    TempSrc:   Oral    SpO2:  99% 99% 99%   Weight:       Height:   1.651 m (5' 5\")        Gen: A/OX3, nasal cannula  Chest: no increased work of breathing.  Abd; Soft, moderately tender, no guarding, no rebound     Latest Reference Range & Units 04/15/24 02:20 04/15/24 06:01 04/15/24 11:51   Hemoglobin 11.7 - 15.7 g/dL 8.1 (L) 7.5 (L) 8.7 (L)   (L): Data is abnormally low    A/P:    Suzie Islas is a 23 year old F s/p lscope for hemoperitoneum and ruptured ovarian cyst.     Pain: continue ibuprofen and tylenol. Oxycodone PRN.  Pos op: " encouraged ambulation. Discussed referred pain. Encouraged her to eat as well.   ABL anemia: s/p one unit PRBC. Curt 7.5. Hgb glen appropriately to 8.7. Though still slightly symptomatic. Recheck tonight and in AM.   Hypokalemia: WNL on recheck.   Will continue outpatient monitoring overnight with planned discharge in the morning.       Elena Hunt MD  She/Her  [phone]  04/15/24 3:12 PM

## 2024-04-15 NOTE — H&P
[facility]    Consult Note  Obstetrics and Gynecology     Date of Admission:  4/15/2024  Date of Service (when I saw the patient): 04/15/24    Assessment & Plan   Suzie Islas is a 23 year old female G0 female here with hemorrhagic ovarian cyst    Principal Problem:    Hemorrhagic ovarian cyst  Active Problems:    Hemoperitoneum    Hemoperitoneum from actively bleeding left ovarian hemorrhagic cyst: patient consented for Laparoscopy, discussed possible oophorectomy. Discussed risks of surgery including infection, injury to bowel/bladder. Will plan to admit to OBS postop to obtain post-op hgb and assess need for blood transfusion prior to discharge.     Magi Correa MD    Primary Care Physician   Tayler Martinez    Reason for Consult   Reason for consult: I was asked by Dr. Moreau to evaluate this patient for hemoperitoneum and hemorrhagic ovarian cyst.    Chief Complaint   Pelvic pain    History is obtained from the patient    History of Present Illness   Suzie Islas is a 23 year old female who presented to the ER with severe lower abdominal pain and syncope which started after having IC last night.     No prior gyn history.     During eval in the ER, her hemoglobin dropped from 9.6 to 8.1 (baseline of 11.9 last September)    CT scan showed actively bleeding left ovarian cyst.     Past Medical History    Past medical history reviewed with no previously diagnosed medical problems.    Past Surgical History   Past surgical history reviewed with no previous surgeries identified.    Prior to Admission Medications   None     Allergies   Allergies   Allergen Reactions    Allegra [Fexofenadine]     Gluten Meal     Lactose        Social History   N/C.    Family History   Family history reviewed with patient and is noncontributory.    Review of Systems   The 5 point Review of Systems is negative other than noted in the HPI or here.     Physical Exam       BP: (!) 89/48 Pulse: 75   Resp: 11  SpO2: 100 % O2 Device: None (Room air)    Vital Signs with Ranges  Pulse:  [58-85] 75  Resp:  [10-37] 11  BP: ()/(45-63) 89/48  SpO2:  [100 %] 100 %  0 lbs 0 oz    Constitutional: awake, alert, in mild distress  Respiratory: No increased work of breathing, good air exchange, clear to auscultation bilaterally, no crackles or wheezing  Cardiovascular: Normal apical impulse, regular rate and rhythm, normal S1 and S2, no S3 or S4, and no murmur noted  GI: No scars, normal bowel sounds, soft, non-distended, non-tender, no masses palpated, no hepatosplenomegally  Genitounirinary: External Genitalia:  General appearance; normal  Skin/Extremities: wnl  Musculoskeletal: wnl  Neurologic: Awake, alert, oriented to name, place and time.  Cranial nerves II-XII are grossly intact.  Motor is 5 out of 5 bilaterally.  Cerebellar finger to nose, heel to shin intact.  Sensory is intact.  Babinski down going, Romberg negative, and gait is normal.  Neuropsychiatric: General: restless    Data   I personally reviewed the CT  image(s) showing left ovarian cyst and hemoperitoneum .  Most Recent 3 CBC's:  Recent Labs   Lab Test 04/15/24  0220 04/15/24  0024 09/07/23  1605   WBC  --  15.2* 7.1   HGB 8.1* 9.6* 11.9   MCV  --  77* 78   PLT  --  328 317

## 2024-04-15 NOTE — ED TRIAGE NOTES
Pt brought in by sig other with 10/10 uterine pain nausea since about 1830 yesterday evening. Pt has taken 1000mg of tylenol and 400 ibuprofen. When in triage pt appeared to become unresponsive for about 30 seconds, unable to speak. PT diaphoretic, dizzy, and lightheaded.

## 2024-04-15 NOTE — ANESTHESIA CARE TRANSFER NOTE
Patient: Suzie Islas    Procedure: Procedure(s):  Laparoscopy; Evacuation of hemoperitoneum       Diagnosis: Ovarian bleed [N83.8]  Diagnosis Additional Information: No value filed.    Anesthesia Type:   General     Note:    Oropharynx: oropharynx clear of all foreign objects and spontaneously breathing  Level of Consciousness: awake  Oxygen Supplementation: face mask  Level of Supplemental Oxygen (L/min / FiO2): 6  Independent Airway: airway patency satisfactory and stable  Dentition: dentition unchanged  Vital Signs Stable: post-procedure vital signs reviewed and stable    Patient transferred to: PACU    Handoff Report: Identifed the Patient, Identified the Reponsible Provider, Reviewed the pertinent medical history, Discussed the surgical course, Reviewed Intra-OP anesthesia mangement and issues during anesthesia, Set expectations for post-procedure period and Allowed opportunity for questions and acknowledgement of understanding      Vitals:  Vitals Value Taken Time   /44 04/15/24 0539   Temp 37.2    Pulse 95 04/15/24 0539   Resp 26 04/15/24 0540   SpO2 100 % 04/15/24 0540   Vitals shown include unfiled device data.    Electronically Signed By: Christine Marie Volp Hodgkins, CRNA, APRN CRNA  April 15, 2024  5:42 AM

## 2024-04-15 NOTE — ED PROVIDER NOTES
History     Chief Complaint:  Abdominal Pain    HPI  Suzie Islas is a 23 year old female presenting for evaluation of abdominal pain.  The patient was having intercourse with her boyfriend when she developed sudden severe pain in her pelvis.  She thought it would go away but is continued to worsen.  She states any movement hurts and that she has never had similar pain in the past.  She notes that the pain feels like it radiates to her shoulders.  She denies any vaginal bleeding.  No known injury.  No prior history of similar symptoms.  She denies the possibility of pregnancy.  No other complaints.      Independent Historian:   The patient's boyfriend indicates that she has appeared pale to him since this time and has been very uncomfortable.      Medications:    Lexapro  Vistaril  Ativan  Zofran    Past Medical History:    LGSIL on cytologic smear of cervix    Past Surgical History:    Third molar extraction     Physical Exam   No data found.     Physical Exam  General: Resting on the gurney, appears very uncomfortable  Head:  The scalp, face, and head appear normal  Mouth/Throat: Mucus membranes are moist  CV:  Regular rate    Normal S1 and S2  No pathological murmur   Resp:  Breath sounds clear and equal bilaterally    Non-labored, no retractions or accessory muscle use    No coarseness    No wheezing   GI:  Abdomen is soft    Severe abdominal tenderness to palpation in the lower abdomen.  Rebound present.  MS:  Normal motor assessment of all extremities.    Good capillary refill noted.  Skin:  No rash or lesions noted.  Neuro:   Speech is normal and fluent. No apparent deficit.  Psych: Awake. Alert.  Normal affect.      Appropriate interactions.      Emergency Department Course     Imaging:  CT Abdomen Pelvis w Contrast   Final Result   IMPRESSION:    Moderate volume of hemorrhagic free fluid in the abdomen and pelvis due to a small hemorrhagic cyst of the left ovary which appears to be actively bleeding.       I discussed the findings with Dr. Moreau of the ED at 2:45 AM on 04/15/2024.      US Pelvis Cmpl wo Transvaginal w Abd/Pel Duplex Lmt   Final Result   IMPRESSION:     1.  Normal appearance of the uterus and ovaries. No evidence for ovarian torsion.   2.  Moderate volume of complex free fluid of uncertain significance. CT suggested for further evaluation.                 Laboratory:  Labs Ordered and Resulted from Time of ED Arrival to Time of ED Departure   BASIC METABOLIC PANEL - Abnormal       Result Value    Sodium 141      Potassium 3.1 (*)     Chloride 106      Carbon Dioxide (CO2) 23      Anion Gap 12      Urea Nitrogen 6.6      Creatinine 0.92      GFR Estimate 89      Calcium 8.9      Glucose 144 (*)    CBC WITH PLATELETS AND DIFFERENTIAL - Abnormal    WBC Count 15.2 (*)     RBC Count 3.78 (*)     Hemoglobin 9.6 (*)     Hematocrit 29.2 (*)     MCV 77 (*)     MCH 25.4 (*)     MCHC 32.9      RDW 14.2      Platelet Count 328      % Neutrophils 72      % Lymphocytes 21      % Monocytes 6      % Eosinophils 1      % Basophils 0      % Immature Granulocytes 0      NRBCs per 100 WBC 0      Absolute Neutrophils 10.9 (*)     Absolute Lymphocytes 3.2      Absolute Monocytes 0.9      Absolute Eosinophils 0.1      Absolute Basophils 0.0      Absolute Immature Granulocytes 0.1      Absolute NRBCs 0.0     COMPREHENSIVE METABOLIC PANEL - Abnormal    Sodium 140      Potassium 3.1 (*)     Carbon Dioxide (CO2) 22      Anion Gap 11      Urea Nitrogen 6.6      Creatinine 0.91      GFR Estimate 90      Calcium 8.9      Chloride 107      Glucose 144 (*)     Alkaline Phosphatase 47      AST 18      ALT 14      Protein Total 6.1 (*)     Albumin 3.6      Bilirubin Total 0.3     HEMOGLOBIN - Abnormal    Hemoglobin 8.1 (*)    ISTAT HCG QUALITATIVE PREGNANCY POCT - Normal    HCG Qualitative POCT Negative     TYPE AND SCREEN, ADULT    ABO/RH(D) O POS      Antibody Screen Negative      SPECIMEN EXPIRATION DATE [id number]      ABO/RH TYPE AND SCREEN        Procedures    Emergency Department Course & Assessments:    Interventions:  Toradol  Dilaudid  Dilaudid  Dilaudid  IV fluids    Assessments:  0014 I obtained history and examined the patient as noted above.     Independent Interpretation (X-rays, CTs, rhythm strip):  CT obtained with significant blood products in the abdomen.  No obvious free air    Consultations/Discussion of Management or Tests:  Case discussed with gynecology who graciously agreed to take the patient to the operating room     Disposition:  The patient was admitted to the hospital under the care of Dr. Correa.     Impression & Plan       Medical Decision Making:  Suzie Islas is a 23 year old female who presents to the emergency department with severe pelvic pain following intercourse.  She is also noted to be pale and lightheaded.  Laboratory evaluation was undertaken and her initial hemoglobin had dropped approximately 1-1/2 g from fall.  I rechecked this and it dropped an additional 1-1/2 g over 2 hours in the emergency department.  Her pelvic ultrasound showed free fluid but no evidence of cyst.  A CT was obtained which showed active bleeding from the left ovary with significant blood products in the abdomen.  Her blood pressure was low in the emergency department but did correct with IV fluids.  A type and screen was obtained and consent for blood products as well.  The operating room was ready for the patient and she was transferred there for definitive surgical management.    Critical care time 65 minutes.    Diagnosis:  1. Hemorrhagic cyst of left ovary    2.      Acute blood loss anemia              Shyanne Moreau MD  04/15/24 0846

## 2024-04-15 NOTE — ANESTHESIA POSTPROCEDURE EVALUATION
Patient: Suzie Islas    Procedure: Procedure(s):  Laparoscopy; Evacuation of hemoperitoneum       Anesthesia Type:  General    Note:     Postop Pain Control: Uneventful            Sign Out: Well controlled pain   PONV: No   Neuro/Psych: Uneventful            Sign Out: Acceptable/Baseline neuro status   Airway/Respiratory: Uneventful            Sign Out: Acceptable/Baseline resp. status   CV/Hemodynamics: Uneventful            Sign Out: Acceptable CV status   Other NRE: NONE   DID A NON-ROUTINE EVENT OCCUR?            Last vitals:  Vitals Value Taken Time   BP 87/43 04/15/24 0625   Temp 37.1  C (98.7  F) 04/15/24 0540   Pulse 76 04/15/24 0631   Resp 19 04/15/24 0631   SpO2 99 % 04/15/24 0631   Vitals shown include unfiled device data.    Electronically Signed By: Kevin Maldonado MD  April 15, 2024  6:33 AM

## 2024-04-15 NOTE — PROGRESS NOTES
Date & Time: 0700-1930  Surgery/POD#: POD 0 Laparoscopy for hemoperitoneum and ruptured hemorrhagic cyst    Behavior & Aggression: Green  Fall Risk: No  Orientation:x4  ABNL VS/O2:On room air  ABNL Labs: See chart  Pain Management:Oxy 5mg, scheduled meds   Bowel/Bladder: Continent   Drains: PIV 2  Wounds/incisions: 2 Lap sites with liquid bandage   Diet:Regular   Activity Level: Ind  Tests/Procedures: NA  Anticipated  DC Date: 4/16/24  Significant Information: Tolerating diet, denies nausea/vomit. Report back/shoulder pain, improving.

## 2024-04-15 NOTE — OP NOTE
Laparoscopic Operative Note   Patient Name: Suzie Islas  MRN:    Date of Surgery:    Indications:   1) Hemoperitoneum  2) Ruptured hemorrhagic left ovarian cyst  Post-operative Diagnosis: Ruptured left hemorrhagic ovarian cyst, hemoperitoneum  Procedure Performed: Laparoscopic evacuation of hemoperitoneum, cautery of left ovary   Surgeon:  Magi Correa  Anesthesia: General endotracheal anesthesia   Procedure Details   The patient was seen in the Holding Room. The risks, benefits, complications, treatment options, and expected outcomes were discussed with the patient. The patient concurred with the proposed plan, giving informed consent. The patient was taken to Operating Room were time out was taken to identify patient, planned procedure and any known allergies to drugs or products.   The patient was placed in the dorsal supine position and general endotracheal anesthesia was administered and found to be adequate. The patient was then placed in the dorsal lithotomy position on Danny stirrups. The abdomen, vulva and perineum was then prepped and draped in the normal sterile fashion. The bladder was drained.  A hulka clamp was inserted into the cervix/uterus.   Attention was then turned to the patient's abdomen. A 5 mm incision was made in the base of the umbilicus after injection of 0.25% Marciane with Epinephrine. While tenting the abdomen, the 5 mm visaport trochar was used to obtain entry into the abdomen . Intraperitoneal placement was confirmed and the abdomen was insufflated with CO2 gas. An intra-abdominal survey was performed. There was extensive blood thru out the abdominal cavity.   An 5 mm incision was made 3cm above and medial to the iliac crest on the left and the right after injecting 0.25% Marcaine with Epinephrine. The 5 mm bladed trochar was used to obtain entry into the abdomen with direct visualization on each side. A suction  was inserted and all free blood was removed to allow for  good visualization of the pelvis. Approximately 750cc of blood was noted. The left ovary appeared normal/size shape and a discrete cyst could not be seen but there was active bleeding from the posterior capsule of the ovary, over what appeared to be a vein coursing along the ovarian wall. Ligasure device was used to open the ovary at this site and monopolar cautery was used to cauterize the bleeding edge.  The area was irrigated and the ovary was hemostatic.   All trochars were then removed under direct laparoscopic visualization with excellent hemostasis noted. The skin incisions at the trochar sites were then closed with 3-0 monocryl and Dermabond. The patient tolerated the procedure well.   Intraoperative Findings: left ovarian hemorrhage, hemoperitoneum.   Complications: none  Counts: Sponge, instrument, and needle counts were correct prior to and after closure.   Specimens: none  Estimated Blood Loss 750cc of hemoperitoneum.   IV Fluid Replacement: 4200mL  Urine Output: 300mL of clear, yellow urine at the start of the case  Prophylactic Antibiotics: none indicated  DVT/PE Prophylaxis: SCDs were on and functioning during entire case  Patient disposition: stable to PACU. To be admitted for observation given blood loss anemia.     Magi Correa MD  [facility]  Pager: [phone]

## 2024-04-15 NOTE — PHARMACY-ADMISSION MEDICATION HISTORY
Pharmacist Admission Medication History    Admission medication history is complete. The information provided in this note is only as accurate as the sources available at the time of the update.    Information Source(s): Patient via phone    Pertinent Information: n/a    Changes made to PTA medication list:  Added: OTC's below  Deleted: None  Changed: None    Allergies reviewed with patient and updates made in EHR: no    Medication History Completed By: Siomara Moon RPH 4/15/2024 1:25 PM    PTA Med List   Medication Sig Last Dose    Cranberry 250 MG CAPS Take 1 capsule by mouth at bedtime 4/14/2024    lactobacillus rhamnosus, GG, (CULTURELL) capsule Take 1 capsule by mouth at bedtime 4/14/2024     Siomara Moon, PharmD

## 2024-04-15 NOTE — PLAN OF CARE
Problem: Surgery Nonspecified  Goal: Optimal Pain Control and Function  Outcome: Progressing  Intervention: Prevent or Manage Pain  Recent Flowsheet Documentation  Taken 4/15/2024 1125 by Yousuf Villafuerte RN  Pain Management Interventions: medication (see MAR)  Taken 4/15/2024 1118 by Yousuf Villafuerte RN  Pain Management Interventions: medication (see MAR)  Taken 4/15/2024 1030 by Yousuf Villafuerte RN  Pain Management Interventions: medication (see MAR)  Taken 4/15/2024 1000 by Yousuf Villafuerte RN  Pain Management Interventions: medication (see MAR)  Taken 4/15/2024 0846 by Yousuf Villafuerte RN  Pain Management Interventions: medication (see MAR)   Goal Outcome Evaluation:  Pain better control with IV Dilaudid. Patient ambulated in room, voided in bathroom.     Patient VSS are at baseline: Yes    Patient able to ambulate as they were prior to admission or with assist devices provided by therapies during their stay: Yes    Patient able to tolerate oral intake and maintain hydration status: Yes    Patient has adequate pain control using oral analgesics: No    Patient must be voiding adequate amounts: Yes    Hypercapnia, Hypoventilation or hypoxia resolved for at least 2 hours without supplemental oxygen: Yes    Deficits in sensation, mobility or coordination have resolved if spinal or regional anesthesia used: Yes    Patient has returned to baseline mental status: Yes

## 2024-04-15 NOTE — OR NURSING
0655Hr - [facility] Erica updated on Pts status; Hypotension improved post Albumin dose; SBP Upper 90's with Pt supine/<15 deg but having difficulty getting BP with HOB >30. Hgb lab result - 7.5 and Pt planned for Hgb recheck in 4hrs.  V.O: Will hold off transfusing now given Pt presently asymtomatic. Okay for Pt to transfer out of PACU. Ensure Floor RN's follow up on planned lab draw.

## 2024-04-15 NOTE — ANESTHESIA PROCEDURE NOTES
Airway       Patient location during procedure: OR       Procedure Start/Stop Times: 4/15/2024 4:13 AM  Staff -        Anesthesiologist:  Kevin Maldonado MD       CRNA: Hodgkins, Christine Marie Volp, APRN CRNA       Performed By: CRNA  Consent for Airway        Urgency: elective  Indications and Patient Condition       Indications for airway management: delilah-procedural       Induction type:intravenous       Mask difficulty assessment: 1 - vent by mask    Final Airway Details       Final airway type: endotracheal airway       Successful airway: ETT - single  Endotracheal Airway Details        ETT size (mm): 7.0       Successful intubation technique: video laryngoscopy       VL Blade Size: Reid 3       Grade View of Cords: 1       Adjucts: stylet       Position: Right       Measured from: lips       Secured at (cm): 21       Bite block used: None    Post intubation assessment        Placement verified by: capnometry, equal breath sounds and chest rise        Number of attempts at approach: 1       Number of other approaches attempted: 0       Secured with: tape       Ease of procedure: easy       Dentition: Intact and Unchanged    Medication(s) Administered   Medication Administration Time: 4/15/2024 4:13 AM

## 2024-04-15 NOTE — ANESTHESIA PREPROCEDURE EVALUATION
"Anesthesia Pre-Procedure Evaluation    Patient: Suzie Islas   MRN: [id number] : 2000        Procedure : Procedure(s):  Diagnostic Laparoscopy; possible unilateral oophorectomy          No past medical history on file.   No past surgical history on file.   Allergies   Allergen Reactions    Allegra [Fexofenadine]     Gluten Meal     Lactose       Social History     Tobacco Use    Smoking status: Not on file    Smokeless tobacco: Not on file   Substance Use Topics    Alcohol use: Not on file      Wt Readings from Last 1 Encounters:   23 81.6 kg (180 lb)        Anesthesia Evaluation   Pt has had prior anesthetic. Type: MAC.    No history of anesthetic complications       ROS/MED HX  ENT/Pulmonary:     (+)                tobacco use,                        Neurologic:       Cardiovascular:       METS/Exercise Tolerance:     Hematologic:       Musculoskeletal:       GI/Hepatic:       Renal/Genitourinary:       Endo:       Psychiatric/Substance Use:     (+) psychiatric history anxiety and depression   Recreational drug usage: Cannabis.    Infectious Disease:       Malignancy:       Other:               OUTSIDE LABS:  CBC:   Lab Results   Component Value Date    WBC 15.2 (H) 04/15/2024    WBC 7.1 2023    HGB 8.1 (L) 04/15/2024    HGB 9.6 (L) 04/15/2024    HCT 29.2 (L) 04/15/2024    HCT 37.6 2023     04/15/2024     2023     BMP:   Lab Results   Component Value Date     04/15/2024     04/15/2024    POTASSIUM 3.1 (L) 04/15/2024    POTASSIUM 3.1 (L) 04/15/2024    CHLORIDE 106 04/15/2024    CHLORIDE 107 04/15/2024    CO2 23 04/15/2024    CO2 22 04/15/2024    BUN 6.6 04/15/2024    BUN 6.6 04/15/2024    CR 0.92 04/15/2024    CR 0.91 04/15/2024     (H) 04/15/2024     (H) 04/15/2024     COAGS: No results found for: \"PTT\", \"INR\", \"FIBR\"  POC:   Lab Results   Component Value Date    HCGS Negative 2023     HEPATIC:   Lab Results   Component Value Date    " ALBUMIN 3.6 04/15/2024    PROTTOTAL 6.1 (L) 04/15/2024    ALT 14 04/15/2024    AST 18 04/15/2024    ALKPHOS 47 04/15/2024    BILITOTAL 0.3 04/15/2024     OTHER:   Lab Results   Component Value Date    EDMUNDO 8.9 04/15/2024    EDMUNDO 8.9 04/15/2024    LIPASE 18 09/07/2023       Anesthesia Plan    ASA Status:  2, emergent    NPO Status:  NPO Appropriate    Anesthesia Type: General.     - Airway: ETT   Induction: Intravenous, RSI.   Maintenance: Balanced.        Consents    Anesthesia Plan(s) and associated risks, benefits, and realistic alternatives discussed. Questions answered and patient/representative(s) expressed understanding.     - Discussed:     - Discussed with:  Patient            Postoperative Care    Pain management: IV analgesics, Oral pain medications, Multi-modal analgesia.   PONV prophylaxis: Ondansetron (or other 5HT-3), Dexamethasone or Solumedrol, Background Propofol Infusion     Comments:               Kevin Maldonado MD    I have reviewed the pertinent notes and labs in the chart from the past 30 days and (re)examined the patient.  Any updates or changes from those notes are reflected in this note.    # Hypokalemia: Lowest K = 3.1 mmol/L in last 2 days, will replace as needed

## 2024-04-16 LAB
BLD PROD TYP BPU: NORMAL
BLOOD COMPONENT TYPE: NORMAL
CODING SYSTEM: NORMAL
CROSSMATCH: NORMAL
GLUCOSE BLDC GLUCOMTR-MCNC: 83 MG/DL (ref 70–99)
HGB BLD-MCNC: 7.4 G/DL (ref 11.7–15.7)
HGB BLD-MCNC: 9 G/DL (ref 11.7–15.7)
ISSUE DATE AND TIME: NORMAL
POTASSIUM SERPL-SCNC: 3.3 MMOL/L (ref 3.4–5.3)
POTASSIUM SERPL-SCNC: 3.9 MMOL/L (ref 3.4–5.3)
UNIT ABO/RH: NORMAL
UNIT NUMBER: NORMAL
UNIT STATUS: NORMAL
UNIT TYPE ISBT: 5100

## 2024-04-16 PROCEDURE — 250N000013 HC RX MED GY IP 250 OP 250 PS 637: Performed by: OBSTETRICS & GYNECOLOGY

## 2024-04-16 PROCEDURE — 36415 COLL VENOUS BLD VENIPUNCTURE: CPT | Performed by: OBSTETRICS & GYNECOLOGY

## 2024-04-16 PROCEDURE — P9016 RBC LEUKOCYTES REDUCED: HCPCS | Performed by: STUDENT IN AN ORGANIZED HEALTH CARE EDUCATION/TRAINING PROGRAM

## 2024-04-16 PROCEDURE — 82962 GLUCOSE BLOOD TEST: CPT

## 2024-04-16 PROCEDURE — 84132 ASSAY OF SERUM POTASSIUM: CPT | Performed by: STUDENT IN AN ORGANIZED HEALTH CARE EDUCATION/TRAINING PROGRAM

## 2024-04-16 PROCEDURE — 85018 HEMOGLOBIN: CPT | Performed by: OBSTETRICS & GYNECOLOGY

## 2024-04-16 PROCEDURE — 250N000011 HC RX IP 250 OP 636: Performed by: OBSTETRICS & GYNECOLOGY

## 2024-04-16 PROCEDURE — 250N000013 HC RX MED GY IP 250 OP 250 PS 637: Performed by: STUDENT IN AN ORGANIZED HEALTH CARE EDUCATION/TRAINING PROGRAM

## 2024-04-16 PROCEDURE — 36415 COLL VENOUS BLD VENIPUNCTURE: CPT | Performed by: STUDENT IN AN ORGANIZED HEALTH CARE EDUCATION/TRAINING PROGRAM

## 2024-04-16 RX ORDER — OXYCODONE HYDROCHLORIDE 5 MG/1
5 TABLET ORAL EVERY 4 HOURS PRN
Qty: 12 TABLET | Refills: 0 | Status: SHIPPED | OUTPATIENT
Start: 2024-04-16

## 2024-04-16 RX ORDER — POTASSIUM CHLORIDE 1.5 G/1.58G
40 POWDER, FOR SOLUTION ORAL ONCE
Status: COMPLETED | OUTPATIENT
Start: 2024-04-16 | End: 2024-04-16

## 2024-04-16 RX ORDER — IBUPROFEN 600 MG/1
600 TABLET, FILM COATED ORAL EVERY 6 HOURS PRN
Qty: 30 TABLET | Refills: 0 | Status: SHIPPED | OUTPATIENT
Start: 2024-04-16

## 2024-04-16 RX ADMIN — IBUPROFEN 800 MG: 400 TABLET ORAL at 23:49

## 2024-04-16 RX ADMIN — SENNOSIDES AND DOCUSATE SODIUM 1 TABLET: 50; 8.6 TABLET ORAL at 20:11

## 2024-04-16 RX ADMIN — ONDANSETRON 4 MG: 4 TABLET, ORALLY DISINTEGRATING ORAL at 21:48

## 2024-04-16 RX ADMIN — ACETAMINOPHEN 975 MG: 325 TABLET, FILM COATED ORAL at 21:47

## 2024-04-16 RX ADMIN — OXYCODONE HYDROCHLORIDE 5 MG: 5 TABLET ORAL at 23:49

## 2024-04-16 RX ADMIN — ONDANSETRON 4 MG: 4 TABLET, ORALLY DISINTEGRATING ORAL at 12:20

## 2024-04-16 RX ADMIN — KETOROLAC TROMETHAMINE 15 MG: 15 INJECTION, SOLUTION INTRAMUSCULAR; INTRAVENOUS at 17:15

## 2024-04-16 RX ADMIN — SENNOSIDES AND DOCUSATE SODIUM 1 TABLET: 50; 8.6 TABLET ORAL at 08:15

## 2024-04-16 RX ADMIN — KETOROLAC TROMETHAMINE 15 MG: 15 INJECTION, SOLUTION INTRAMUSCULAR; INTRAVENOUS at 12:19

## 2024-04-16 RX ADMIN — POTASSIUM CHLORIDE 40 MEQ: 1.5 POWDER, FOR SOLUTION ORAL at 14:10

## 2024-04-16 RX ADMIN — ACETAMINOPHEN 975 MG: 325 TABLET, FILM COATED ORAL at 05:01

## 2024-04-16 RX ADMIN — OXYCODONE HYDROCHLORIDE 10 MG: 5 TABLET ORAL at 08:16

## 2024-04-16 RX ADMIN — IBUPROFEN 800 MG: 400 TABLET ORAL at 06:24

## 2024-04-16 RX ADMIN — OXYCODONE HYDROCHLORIDE 10 MG: 5 TABLET ORAL at 18:55

## 2024-04-16 RX ADMIN — ACETAMINOPHEN 975 MG: 325 TABLET, FILM COATED ORAL at 11:00

## 2024-04-16 RX ADMIN — ACETAMINOPHEN 975 MG: 325 TABLET, FILM COATED ORAL at 17:14

## 2024-04-16 ASSESSMENT — ACTIVITIES OF DAILY LIVING (ADL)
ADLS_ACUITY_SCORE: 20

## 2024-04-16 NOTE — PROGRESS NOTES
"GYN Postoperative Observation Bed Note    S: Suzie doing better. Tolerating a regular diet, somewhat nauseous this morning. Voiding spontaneously. No BM or flatus yet. Pain well controlled with PO meds now. On room air. She has not yet gotten out of bed.     O: /44 (BP Location: Left arm)   Pulse 59   Temp 98.2  F (36.8  C) (Oral)   Resp 16   Ht 1.651 m (5' 5\")   Wt 82 kg (180 lb 12.4 oz)   SpO2 100%   BMI 30.08 kg/m       Gen: A/Ox3, NAD  Chest: regular effort  Abd: soft, minimally tender, three LSC port sites    A/P: Suzie Islas is a 24yo F POD1, s/p LSC hemoperitoneum and reuptured hemorrhagic cyst.     S/p laparoscopy: pain well managed. Continue PRN meds.   Hypotension: improving with albumin. Plan for 1 unit PRBC (canceled by postop/anesthesia) pt did NOT receive blood products.  ABL anemia: s/p one unit PRBC. Curt 7.5. Hgb glen appropriately to 8.7.   This morning some dizziness/blurry vision  Additional 1u pRBC ordered    Dispo: continue inpatient management    Selina Zaman MD  OBGYN, [facility]  04/16/24 8:09 AM   Clinic number: [phone] (forwarded to oncall provider after hours)    "

## 2024-04-16 NOTE — UTILIZATION REVIEW
Admission Status; Secondary Review Determination         Under the authority of the Utilization Management Committee, the utilization review process indicated a secondary review on the above patient.  The review outcome is based on review of the medical records, discussions with staff, and applying clinical experience noted on the date of the review.        (x)      Inpatient Status Appropriate - This patient's medical care is consistent with medical management for inpatient care and reasonable inpatient medical practice.          RATIONALE FOR DETERMINATION   The patient is a 23-year-old female admitted on 4/15/2024.  Patient is a  0 with a hemorrhagic ovarian cyst.  Patient consented for laparoscopy which occurred on the day of admission.  Imaging showed a hemoperitoneum and findings from the laparoscopic procedure were a ruptured left hemorrhagic ovarian cyst with hemoperitoneum and the procedure included evacuation of hemoperitoneum and cautery of the left ovary.  There were no perioperative complications however patient developed postop hypotension improved with albumin and required 1 unit of packed red cells.  Current hemoglobin is 7.4 with 1 additional unit of blood being administered this afternoon.  Patient also has some degree of dizziness and blurry vision.  Based on postop complications, the patient is appropriate to stay an additional midnight stay and should be switched from outpatient to inpatient due to findings above.  Dr. Rola Ortiz will be notified via [employer] with this recommendation.  Blood pressures are documented as low as 93/39 yesterday evening and night.      The severity of illness, intensity of service provided, expected LOS and risk for adverse outcome make the care complex, high risk and appropriate for hospital admission.        The information on this document is developed by the utilization review team in order for the business office to ensure compliance.  This only  denotes the appropriateness of proper admission status and does not reflect the quality of care rendered.         The definitions of Inpatient Status and Observation Status used in making the determination above are those provided in the CMS Coverage Manual, Chapter 1 and Chapter 6, section 70.4.      Sincerely,     Raphael Jung MD  Physician Advisor  Utilization Review/ Case Management  [facility].

## 2024-04-16 NOTE — PLAN OF CARE
Date & Time: 4/15 7pm- 4/16 7am  Surgery/POD#: POD 1 laparoxcopy for hemoperitoneum and ruptured hemorrhagic cyst  Behavior & Aggression: green  Fall Risk: no  Orientation: AOx4  ABNL VS/O2: VSS on RA, hypotensive  ABNL Labs: hgb 8.1 at 7pm, recheck in AM  Pain Management: pain managed w/ scheduled meds and PRN oxy  Bowel/Bladder: continent, voiding spontaneously  Drains/lines: PIV SL  Wounds/incisions: 2 abd lap sites CDI ELISA  Diet: Reg  Activity Level: Ind, ambulating to bathroom  Anticipated  DC Date: pending 4/16

## 2024-04-16 NOTE — PLAN OF CARE
Goal Outcome Evaluation:    Orientation: A+Ox4    Vitals/Tele: Soft BPs, on RA    IV Access/drains: PIV x2, SL    Diet: Regular    Mobility: Indp in room     GI/: Continent of B+B    Wound/Skin: Abd lap sites x2    Discharge Plan: Possible discharge tomorrow 4/17/24.     Others: Transfused today, K replaced. Pending recheck for K and Hgb       See Flow sheets for assessment

## 2024-04-17 VITALS
HEART RATE: 66 BPM | WEIGHT: 180.78 LBS | TEMPERATURE: 98 F | SYSTOLIC BLOOD PRESSURE: 112 MMHG | BODY MASS INDEX: 30.12 KG/M2 | OXYGEN SATURATION: 98 % | DIASTOLIC BLOOD PRESSURE: 64 MMHG | RESPIRATION RATE: 16 BRPM | HEIGHT: 65 IN

## 2024-04-17 PROBLEM — Z98.890 S/P LAPAROSCOPY: Status: ACTIVE | Noted: 2024-04-17

## 2024-04-17 LAB — GLUCOSE BLDC GLUCOMTR-MCNC: 86 MG/DL (ref 70–99)

## 2024-04-17 PROCEDURE — 82962 GLUCOSE BLOOD TEST: CPT

## 2024-04-17 PROCEDURE — 120N000001 HC R&B MED SURG/OB

## 2024-04-17 PROCEDURE — 250N000013 HC RX MED GY IP 250 OP 250 PS 637: Performed by: OBSTETRICS & GYNECOLOGY

## 2024-04-17 PROCEDURE — 250N000011 HC RX IP 250 OP 636: Performed by: OBSTETRICS & GYNECOLOGY

## 2024-04-17 RX ADMIN — KETOROLAC TROMETHAMINE 15 MG: 15 INJECTION, SOLUTION INTRAMUSCULAR; INTRAVENOUS at 06:05

## 2024-04-17 RX ADMIN — ACETAMINOPHEN 975 MG: 325 TABLET, FILM COATED ORAL at 05:08

## 2024-04-17 ASSESSMENT — ACTIVITIES OF DAILY LIVING (ADL)
ADLS_ACUITY_SCORE: 20

## 2024-04-17 NOTE — PROGRESS NOTES
"Suzie Islas   GYN Postoperative Note  POD #2    S: Suzie doing much better today. Tolerating a regular diet, but still not a full appetite but improving. Voiding spontaneously. +flatus, no BM yet. Pain well controlled with PO meds now. On room air. She is ambulating without difficulty this morning.       O: /64 (BP Location: Right arm)   Pulse 66   Temp 98  F (36.7  C) (Oral)   Resp 16   Ht 1.651 m (5' 5\")   Wt 82 kg (180 lb 12.4 oz)   SpO2 98%   BMI 30.08 kg/m       Gen: A/Ox3, NAD  Chest: regular effort  Abd: soft, minimally tender, three LSC port sites that are clean dry, intact    A/P: Suzie Islas is a 22yo F POD2, s/p LSC hemoperitoneum and reuptured hemorrhagic cyst.     S/p laparoscopy: pain well managed. Continue PRN meds.   Hypotension: improving with albumin. Plan for 1 unit PRBC (canceled by postop/anesthesia) pt did NOT receive blood products.  ABL anemia: s/p one unit PRBC. Curt 7.5. Hgb glen appropriately to 9.0. pt feeling much better.  - follow up in clinic prn    Dispo: home today. Follow up in 2 and 6 weeks postop.    Michael Lyles MD   Clinic number: [phone] (forwarded to oncall provider after hours)    "

## 2024-04-17 NOTE — PLAN OF CARE
Goal Outcome Evaluation:    Patient approved for discharge back to home.  Reviewed the AVS and med list with the patient and her significant other.  All questions were answered.  Discharge meds were brought up from pharmacy and given to the patient along with patient education.      Patient discharged via door 6 with transportation provided by her significant other.

## 2024-04-17 NOTE — DISCHARGE INSTRUCTIONS
[facility] Discharge Summary    Suzie Islas MRN# [id number]   Age: 23 year old YOB: 2000     Date of Admission:  4/15/2024  Date of Discharge::  4/17/2024   Admitting Physician:  4/17/2024  Discharge Physician:  Michael Lyles MD             Admission Diagnoses:   Hemorrhagic cyst of left ovary [N83.202]          Discharge Diagnosis:     Abdominal pain  Hemoperitoneum  Hemorrhagic ovarian cyst  S/p laparoscopy  ABL anemia          Procedures:     Procedure(s):  {:8524337}       {Additional procedures                 :4272881}           Medications Prior to Admission:   {                  :[id number]}          Discharge Medications:   {                  :4052224}          Consultations:   {                  :6938189}          Brief History of Illness:   {                  :7444892}           Hospital Course:   The patient's hospital course was unremarkable.  She recovered as anticipated and experienced no post-operative complications. ***          Discharge Instructions and Follow-Up:     Discharge diet: {:3802143}   Discharge activity: {:5549682}   Discharge follow-up: {:8850069}   Wound care {:5929344}           Discharge Disposition:     {:7139664}      Attestation:  {   Physician attestation:[id number]}    Michael Lyles MD

## 2024-04-17 NOTE — PLAN OF CARE
Date & Time: 4/16 7pm- 4/17 7am  Surgery/POD#: POD 2 laparoxcopy for hemoperitoneum and ruptured hemorrhagic cyst  Behavior & Aggression: green  Fall Risk: no  Orientation: AOx4  ABNL VS/O2: VSS on RA  ABNL Labs: hgb 9 at 7pm, recheck in AM  Pain Management: pain managed w/ scheduled meds and PRN oxy, PRN zofran  Bowel/Bladder: continent of b&b, voiding spontaneously  Drains/lines: PIV SL  Wounds/incisions: 2 abd lap sites CDI ELISA  Diet: Reg  Activity Level: Ind, ambulating to bathroom  Anticipated  DC Date: pending 4/16  Significant information: int nausea, relieved with zofran. No dizziness this AM, BP normal

## 2024-04-17 NOTE — DISCHARGE SUMMARY
[facility] Discharge Summary    Suzie Islas MRN# [id number]   Age: 23 year old YOB: 2000     Date of Admission:  4/15/2024  Date of Discharge::  4/17/2024   Admitting Physician:  4/17/2024  Discharge Physician:  Michael Lyles MD               Admission Diagnoses:   Hemorrhagic cyst of left ovary [N83.202]          Discharge Diagnosis:     Abdominal pain  Suzie Islas is a 23 year old F s/p lscope for hemoperitoneum and reuptured hemorrhagic cyst   ABL anemia          Procedures:     Procedure(s):  Laparoscopic evacuation of hemoperitoneum, cautery of left ovary        No other procedures performed during this admission           Medications Prior to Admission:     Medications Prior to Admission   Medication Sig Dispense Refill Last Dose    Cranberry 250 MG CAPS Take 1 capsule by mouth at bedtime   4/14/2024    lactobacillus rhamnosus, GG, (CULTURELL) capsule Take 1 capsule by mouth at bedtime   4/14/2024             Discharge Medications:     Current Discharge Medication List        START taking these medications    Details   ibuprofen (ADVIL/MOTRIN) 600 MG tablet Take 1 tablet (600 mg) by mouth every 6 hours as needed for moderate pain  Qty: 30 tablet, Refills: 0    Associated Diagnoses: Hemorrhagic cyst of left ovary; S/P laparoscopy      oxyCODONE (ROXICODONE) 5 MG tablet Take 1 tablet (5 mg) by mouth every 4 hours as needed for moderate to severe pain  Qty: 12 tablet, Refills: 0    Associated Diagnoses: Hemorrhagic cyst of left ovary; S/P laparoscopy           CONTINUE these medications which have NOT CHANGED    Details   Cranberry 250 MG CAPS Take 1 capsule by mouth at bedtime      lactobacillus rhamnosus, GG, (CULTURELL) capsule Take 1 capsule by mouth at bedtime                   Consultations:   No consultations were requested during this admission          Brief History of Illness:   Pt presented to ER  for abdominal pain. Hemoperitoneum identified. Underwent  laparoscopy to evacuated hemoperitoneum and cauterize bleeding ovarian vessel           Hospital Course:   The patient's hospital course was unremarkable.  She recovered as anticipated. She did experience symptoms from anemia and hypokalemia. She was given a unit of PRBC and potassium was replaced.           Discharge Instructions and Follow-Up:     Discharge diet: Regular   Discharge activity: No lifting, driving, or strenuous exercise for 6 week(s)   Discharge follow-up: Follow up with [facility] in 2 and 6 weeks   Wound care Drink plenty of fluids           Discharge Disposition:     Discharged to home      Attestation:  Amount of time performed on this discharge summary: 20 minutes.    Michael Lyles MD

## 2025-03-24 ENCOUNTER — HOSPITAL ENCOUNTER (EMERGENCY)
Facility: CLINIC | Age: 25
Discharge: HOME | End: 2025-03-25
Attending: EMERGENCY MEDICINE | Admitting: EMERGENCY MEDICINE
Payer: COMMERCIAL

## 2025-03-24 VITALS
RESPIRATION RATE: 12 BRPM | HEART RATE: 80 BPM | SYSTOLIC BLOOD PRESSURE: 119 MMHG | OXYGEN SATURATION: 98 % | DIASTOLIC BLOOD PRESSURE: 55 MMHG | TEMPERATURE: 97.6 F

## 2025-03-24 DIAGNOSIS — R09.A2 SENSATION OF FOREIGN BODY IN ESOPHAGUS: ICD-10-CM

## 2025-03-24 PROCEDURE — 99283 EMERGENCY DEPT VISIT LOW MDM: CPT | Mod: 25

## 2025-03-24 ASSESSMENT — COLUMBIA-SUICIDE SEVERITY RATING SCALE - C-SSRS
1. IN THE PAST MONTH, HAVE YOU WISHED YOU WERE DEAD OR WISHED YOU COULD GO TO SLEEP AND NOT WAKE UP?: NO
6. HAVE YOU EVER DONE ANYTHING, STARTED TO DO ANYTHING, OR PREPARED TO DO ANYTHING TO END YOUR LIFE?: NO
2. HAVE YOU ACTUALLY HAD ANY THOUGHTS OF KILLING YOURSELF IN THE PAST MONTH?: NO

## 2025-03-25 ENCOUNTER — APPOINTMENT (OUTPATIENT)
Dept: GENERAL RADIOLOGY | Facility: CLINIC | Age: 25
End: 2025-03-25
Attending: EMERGENCY MEDICINE
Payer: COMMERCIAL

## 2025-03-25 PROCEDURE — 250N000013 HC RX MED GY IP 250 OP 250 PS 637: Performed by: EMERGENCY MEDICINE

## 2025-03-25 PROCEDURE — 71046 X-RAY EXAM CHEST 2 VIEWS: CPT

## 2025-03-25 RX ORDER — MAGNESIUM HYDROXIDE/ALUMINUM HYDROXICE/SIMETHICONE 120; 1200; 1200 MG/30ML; MG/30ML; MG/30ML
15 SUSPENSION ORAL ONCE
Status: COMPLETED | OUTPATIENT
Start: 2025-03-25 | End: 2025-03-25

## 2025-03-25 RX ADMIN — ALUMINUM HYDROXIDE, MAGNESIUM HYDROXIDE, AND SIMETHICONE 15 ML: 200; 200; 20 SUSPENSION ORAL at 01:31

## 2025-03-25 ASSESSMENT — ACTIVITIES OF DAILY LIVING (ADL)
ADLS_ACUITY_SCORE: 54

## 2025-03-25 NOTE — ED PROVIDER NOTES
Emergency Department Note      History of Present Illness     Chief Complaint   Swallowed Foreign Body    HPI   Suzie Islas is a 24 year old female who presents to the ED with her partner for evaluation after swallowing a foreign body. The patient reports she was eating salmon at 2200 when she thinks she swallowed a fish bone. After she finished eating, she laid down and felt a weird and sharp sensation in her upper throat. She then felt this sensation move further down towards the middle of her chest. Patient has discomfort when taking deep breaths, but otherwise breathing fine. She states she tried eating olive oil, bread, peanut butter, and marshmallows, but hasn't eaten anything since 2300.    Independent Historian   None    Past Medical History     Medical History and Problem List   ADHD  Anxiety  Hemoperitoneum  Hemorrhagic ovarian cyst, left  LGSIL    Medications   Ativan   Macrobid   Pyridium   Roxicodone   Zofran     Surgical History   Laparoscopy, evacuation of hemoperitoneum    Physical Exam     Patient Vitals for the past 24 hrs:   BP Temp Temp src Pulse Resp SpO2   03/24/25 2357 119/55 97.6  F (36.4  C) Temporal 80 12 98 %     Physical Exam  General: Sitting up in bed  Eyes:  The pupils are equal and round    Conjunctivae and sclerae are normal  ENT:    Atraumatic face, oropharynx clear  Neck:  Normal range of motion  CV:  Regular rate,  regular rhythm     Skin warm and well perfused   Resp:  Non labored breathing on room air    No tachypnea    No cough heard    Lungs clear bilaterally  MS:  Normal muscular tone  Skin:  No rash or acute skin lesions noted  Neuro:   Awake, alert.      Speech is normal and fluent.    Face is symmetric.     Moves all extremities equally  Psych: Normal affect.  Appropriate interactions.     Diagnostics     Lab Results   None     Imaging   Chest XR,  PA & LAT   Final Result   IMPRESSION: Negative chest. No soft tissue gas over the neck, chest, or upper abdomen. No  radiopaque foreign body. A fishbone might not be demonstrated with x-ray.        EKG   None     Independent Interpretation   CXR: No pneumothorax.    ED Course      Medications Administered   Medications   alum & mag hydroxide-simethicone (MAALOX) suspension 15 mL (15 mLs Oral $Given 3/25/25 0131)     Procedures   None      Discussion of Management   None    ED Course   ED Course as of 03/25/25 0314   Tue Mar 25, 2025   0213 I obtained history and examined the patient as noted above.    0314 I rechecked and updated the patient.      Additional Documentation  None    Medical Decision Making / Diagnosis     RIGO Islas is a 24 year old female who presented to the emergency department with swallowed foreign body.  Patient has a foreign body sensation in the middle of her esophagus.  X-ray does not show foreign body or signs of perforation.  She is able to swallow and eat normally.  Unclear if foreign body is still in place versus irritation from the foreign body previously being there.  Discussed continuing to eat things like bread but if still having foreign body sensation, she needs to call GI in the next day or 2 to be seen for possible endoscopy.  Reasons to return to the emergency department discussed with patient.    Disposition   The patient was discharged.     Diagnosis     ICD-10-CM    1. Sensation of foreign body in esophagus  R09.A2         Scribe Disclosure:  ANIBAL DENISE, am serving as a scribe at 1:46 AM on 3/25/2025 to document services personally performed by Rekha Gutierrez MD based on my observations and the provider's statements to me.      Rekha Gutierrez MD  03/25/25 2679

## 2025-03-25 NOTE — ED TRIAGE NOTES
Ate fish at 2200, [place] a fishbone on her throat, now feels more on the esophagus. +able to drink and swallow saliva ok.      Triage Assessment (Adult)       Row Name 03/24/25 1062          Triage Assessment    Airway WDL WDL        Respiratory WDL    Respiratory WDL WDL        Skin Circulation/Temperature WDL    Skin Circulation/Temperature WDL WDL        Cardiac WDL    Cardiac WDL WDL        Peripheral/Neurovascular WDL    Peripheral Neurovascular WDL WDL        Cognitive/Neuro/Behavioral WDL    Cognitive/Neuro/Behavioral WDL WDL

## 2025-03-25 NOTE — DISCHARGE INSTRUCTIONS
Continue eating foods like bread to see if sensation goes away  If still having sensation, call GI to get seen

## (undated) DEVICE — ESU HOLDER LAP INST DISP PURPLE LONG 330MM H-PRO-330

## (undated) DEVICE — SOL NACL 0.9% INJ 1000ML BAG 2B1324X

## (undated) DEVICE — SOL WATER IRRIG 1000ML BOTTLE 2F7114

## (undated) DEVICE — ENDO SCOPE WARMER LF TM500

## (undated) DEVICE — ESU GROUND PAD UNIVERSAL W/O CORD

## (undated) DEVICE — Device

## (undated) DEVICE — SU MONOCRYL 4-0 PS-2 18" UND Y496G

## (undated) DEVICE — SU DERMABOND ADVANCED .7ML DNX12

## (undated) DEVICE — DECANTER BAG 2002S

## (undated) DEVICE — ENDO TROCAR FIRST ENTRY KII FIOS Z-THRD 05X100MM CTF03

## (undated) DEVICE — ESU LIGASURE MARYLAND LAPAROSCOPIC SLR/DVDR 5MMX37CM LF1937

## (undated) DEVICE — ENDO TROCAR SLEEVE KII Z-THREADED 05X100MM CTS02

## (undated) DEVICE — LINEN TOWEL PACK X5 5464

## (undated) DEVICE — SUCTION IRR STRYKERFLOW II W/TIP 250-070-520

## (undated) RX ORDER — GLYCOPYRROLATE 0.2 MG/ML
INJECTION, SOLUTION INTRAMUSCULAR; INTRAVENOUS
Status: DISPENSED
Start: 2024-04-15

## (undated) RX ORDER — DEXAMETHASONE SODIUM PHOSPHATE 4 MG/ML
INJECTION, SOLUTION INTRA-ARTICULAR; INTRALESIONAL; INTRAMUSCULAR; INTRAVENOUS; SOFT TISSUE
Status: DISPENSED
Start: 2024-04-15

## (undated) RX ORDER — BUPIVACAINE HYDROCHLORIDE AND EPINEPHRINE 2.5; 5 MG/ML; UG/ML
INJECTION, SOLUTION EPIDURAL; INFILTRATION; INTRACAUDAL; PERINEURAL
Status: DISPENSED
Start: 2024-04-15

## (undated) RX ORDER — SCOLOPAMINE TRANSDERMAL SYSTEM 1 MG/1
PATCH, EXTENDED RELEASE TRANSDERMAL
Status: DISPENSED
Start: 2024-04-15

## (undated) RX ORDER — PROPOFOL 10 MG/ML
INJECTION, EMULSION INTRAVENOUS
Status: DISPENSED
Start: 2024-04-15

## (undated) RX ORDER — FENTANYL CITRATE 50 UG/ML
INJECTION, SOLUTION INTRAMUSCULAR; INTRAVENOUS
Status: DISPENSED
Start: 2024-04-15

## (undated) RX ORDER — NEOSTIGMINE METHYLSULFATE 1 MG/ML
VIAL (ML) INJECTION
Status: DISPENSED
Start: 2024-04-15

## (undated) RX ORDER — ONDANSETRON 2 MG/ML
INJECTION INTRAMUSCULAR; INTRAVENOUS
Status: DISPENSED
Start: 2024-04-15

## (undated) RX ORDER — HYDROMORPHONE HYDROCHLORIDE 1 MG/ML
INJECTION, SOLUTION INTRAMUSCULAR; INTRAVENOUS; SUBCUTANEOUS
Status: DISPENSED
Start: 2024-04-15